# Patient Record
Sex: FEMALE | Race: WHITE | NOT HISPANIC OR LATINO | Employment: UNEMPLOYED | URBAN - METROPOLITAN AREA
[De-identification: names, ages, dates, MRNs, and addresses within clinical notes are randomized per-mention and may not be internally consistent; named-entity substitution may affect disease eponyms.]

---

## 2017-11-30 ENCOUNTER — HOSPITAL ENCOUNTER (EMERGENCY)
Facility: HOSPITAL | Age: 27
Discharge: HOME | End: 2017-11-30
Attending: EMERGENCY MEDICINE | Admitting: EMERGENCY MEDICINE
Payer: COMMERCIAL

## 2017-11-30 VITALS
RESPIRATION RATE: 16 BRPM | OXYGEN SATURATION: 97 % | DIASTOLIC BLOOD PRESSURE: 57 MMHG | SYSTOLIC BLOOD PRESSURE: 101 MMHG | HEART RATE: 100 BPM | TEMPERATURE: 98.3 F | BODY MASS INDEX: 28.12 KG/M2 | WEIGHT: 163.8 LBS

## 2017-11-30 DIAGNOSIS — T40.1X1A HEROIN OVERDOSE (HCC): Primary | ICD-10-CM

## 2017-11-30 LAB
ATRIAL RATE: 90 BPM
P AXIS: 59 DEGREES
PR INTERVAL: 170 MS
QRS AXIS: 65 DEGREES
QRSD INTERVAL: 86 MS
QT INTERVAL: 334 MS
QTC INTERVAL: 413 MS
T WAVE AXIS: 10 DEGREES
VENTRICULAR RATE: 92 BPM

## 2017-11-30 PROCEDURE — 93005 ELECTROCARDIOGRAM TRACING: CPT

## 2017-11-30 PROCEDURE — 99284 EMERGENCY DEPT VISIT MOD MDM: CPT

## 2017-11-30 NOTE — DISCHARGE INSTRUCTIONS
DIAGNOSIS: HEROIN OVERDOSE    - PLEASE CONSIDER CALLING THE [facility] TO GET HELP [phone]

## 2017-11-30 NOTE — ED NOTES
Attempted to ambulate pt  Pt refused   Pt states "I don't want to because I feel nauseas "      Karie Maurice RN  11/30/17 9077

## 2017-12-04 NOTE — ED PROVIDER NOTES
History  Chief Complaint   Patient presents with    Heroin Overdose - Accidental     Per EMS pt found unconscious unresponsive, blue  friends brought pt into bathtub to wake with cold water  Police on location gave 8mg Narcan nasal  pt vomited once prior to EMS arrival      28 yr female daily idu of hwroin -found down -- received intra-nasal anrcan x 2-- with reverdal -- pt now with no comps- does nto want any rehab help-- no si/plan/intnet -         History provided by:  Patient and EMS personnel   used: No        None       History reviewed  No pertinent past medical history  Past Surgical History:   Procedure Laterality Date     SECTION      x 3    TUBAL LIGATION         History reviewed  No pertinent family history  I have reviewed and agree with the history as documented  Social History   Substance Use Topics    Smoking status: Current Every Day Smoker     Packs/day: 0 50     Types: Cigarettes    Smokeless tobacco: Never Used    Alcohol use No        Review of Systems   Constitutional: Negative  HENT: Negative  Eyes: Negative  Respiratory: Negative  Cardiovascular: Negative  Gastrointestinal: Negative  Endocrine: Negative  Genitourinary: Negative  Musculoskeletal: Negative  Skin: Negative  Allergic/Immunologic: Negative  Neurological: Negative  Hematological: Negative  Psychiatric/Behavioral: Negative          Physical Exam  ED Triage Vitals   Temperature Pulse Respirations Blood Pressure SpO2   17 0043 17 0039 17 0039 17 0043 17 0043   98 3 °F (36 8 °C) 96 18 110/71 99 %      Temp Source Heart Rate Source Patient Position - Orthostatic VS BP Location FiO2 (%)   17 0043 17 0039 17 0043 17 0043 --   Oral Monitor Sitting Right arm       Pain Score       17 0039       No Pain           Orthostatic Vital Signs  Vitals:    17 0043 17 0331 17 0625 17 0630 BP: 110/71 110/76 107/53 101/57   Pulse:  (!) 115 93 100   Patient Position - Orthostatic VS: Sitting Sitting Lying Lying       Physical Exam   Constitutional: She is oriented to person, place, and time  She appears well-developed and well-nourished  No distress  avss-- pulse ox 97 % on ra- intepretation is normal- no intervention    HENT:   Head: Normocephalic and atraumatic  Eyes: Conjunctivae and EOM are normal  Pupils are equal, round, and reactive to light  Right eye exhibits no discharge  Left eye exhibits no discharge  No scleral icterus  Neck: Normal range of motion  Neck supple  No JVD present  No tracheal deviation present  No thyromegaly present  Cardiovascular: Normal rate, regular rhythm, normal heart sounds and intact distal pulses  Exam reveals no gallop and no friction rub  No murmur heard  Pulmonary/Chest: Effort normal and breath sounds normal  No stridor  No respiratory distress  She has no wheezes  She has no rales  She exhibits no tenderness  Abdominal: Soft  Bowel sounds are normal  She exhibits no distension and no mass  There is no tenderness  There is no rebound and no guarding  No hernia  Musculoskeletal: Normal range of motion  She exhibits no edema, tenderness or deformity  Lymphadenopathy:     She has no cervical adenopathy  Neurological: She is alert and oriented to person, place, and time  No cranial nerve deficit or sensory deficit  She exhibits normal muscle tone  Coordination normal    Skin: Skin is warm  Capillary refill takes less than 2 seconds  No rash noted  She is not diaphoretic  No erythema  No pallor  Bilateral ac track marks-- no signs of infection/abscess   Psychiatric: She has a normal mood and affect  Her behavior is normal  Judgment and thought content normal    Vitals reviewed        ED Medications  Medications - No data to display    Diagnostic Studies  Results Reviewed     None                 No orders to display Procedures  Procedures       Phone Contacts  ED Phone Contact    ED Course  ED Course as of Dec 04 0545   Thu Nov 30, 2017   2721 Er md note-  pt tolerated ambulation in er prior to er d/c- is not interested in rehab will d/c                                 MDM  CritCare Time    Disposition  Final diagnoses:   Heroin overdose     Time reflects when diagnosis was documented in both MDM as applicable and the Disposition within this note     Time User Action Codes Description Comment    11/30/2017  5:15 AM Realmarino Sean Add [T40 [id number]] Heroin overdose       ED Disposition     ED Disposition Condition Comment    Discharge  Werner Christopher discharge to home/self care  Condition at discharge: Good        Follow-up Information    None       There are no discharge medications for this patient  No discharge procedures on file      ED Provider  Electronically Signed by           Fabiano Jacob MD  12/04/17 6667

## 2021-06-08 ENCOUNTER — APPOINTMENT (EMERGENCY)
Dept: CT IMAGING | Facility: HOSPITAL | Age: 31
End: 2021-06-08
Payer: OTHER GOVERNMENT

## 2021-06-08 ENCOUNTER — HOSPITAL ENCOUNTER (OUTPATIENT)
Facility: HOSPITAL | Age: 31
Setting detail: OUTPATIENT SURGERY
Discharge: HOME | End: 2021-06-10
Attending: EMERGENCY MEDICINE | Admitting: SURGERY
Payer: OTHER GOVERNMENT

## 2021-06-08 ENCOUNTER — APPOINTMENT (EMERGENCY)
Dept: ULTRASOUND IMAGING | Facility: HOSPITAL | Age: 31
End: 2021-06-08
Payer: OTHER GOVERNMENT

## 2021-06-08 DIAGNOSIS — Z90.49 STATUS POST LAPAROSCOPIC CHOLECYSTECTOMY: ICD-10-CM

## 2021-06-08 DIAGNOSIS — K81.0 ACUTE CHOLECYSTITIS: Primary | ICD-10-CM

## 2021-06-08 DIAGNOSIS — R10.11 RIGHT UPPER QUADRANT PAIN: ICD-10-CM

## 2021-06-08 LAB
ALBUMIN SERPL BCP-MCNC: 3.8 G/DL (ref 3.5–5)
ALP SERPL-CCNC: 103 U/L (ref 46–116)
ALT SERPL W P-5'-P-CCNC: 47 U/L (ref 12–78)
ANION GAP SERPL CALCULATED.3IONS-SCNC: 10 MMOL/L (ref 4–13)
AST SERPL W P-5'-P-CCNC: 16 U/L (ref 5–45)
BACTERIA UR QL AUTO: ABNORMAL /HPF
BASOPHILS # BLD AUTO: 0.07 THOUSANDS/ΜL (ref 0–0.1)
BASOPHILS NFR BLD AUTO: 1 % (ref 0–1)
BILIRUB DIRECT SERPL-MCNC: 0.13 MG/DL (ref 0–0.2)
BILIRUB SERPL-MCNC: 0.47 MG/DL (ref 0.2–1)
BILIRUB UR QL STRIP: NEGATIVE
BUN SERPL-MCNC: 14 MG/DL (ref 5–25)
CALCIUM SERPL-MCNC: 8.9 MG/DL (ref 8.3–10.1)
CHLORIDE SERPL-SCNC: 101 MMOL/L (ref 100–108)
CLARITY UR: ABNORMAL
CO2 SERPL-SCNC: 29 MMOL/L (ref 21–32)
COLOR UR: YELLOW
CREAT SERPL-MCNC: 0.82 MG/DL (ref 0.6–1.3)
EOSINOPHIL # BLD AUTO: 0.07 THOUSAND/ΜL (ref 0–0.61)
EOSINOPHIL NFR BLD AUTO: 1 % (ref 0–6)
ERYTHROCYTE [DISTWIDTH] IN BLOOD BY AUTOMATED COUNT: 12.4 % (ref 11.6–15.1)
EXT PREG TEST URINE: NEGATIVE
EXT. CONTROL ED NAV: NORMAL
GFR SERPL CREATININE-BSD FRML MDRD: 96 ML/MIN/1.73SQ M
GLUCOSE SERPL-MCNC: 101 MG/DL (ref 65–140)
GLUCOSE UR STRIP-MCNC: NEGATIVE MG/DL
HCT VFR BLD AUTO: 43.6 % (ref 34.8–46.1)
HGB BLD-MCNC: 14 G/DL (ref 11.5–15.4)
HGB UR QL STRIP.AUTO: NEGATIVE
IMM GRANULOCYTES # BLD AUTO: 0.03 THOUSAND/UL (ref 0–0.2)
IMM GRANULOCYTES NFR BLD AUTO: 0 % (ref 0–2)
KETONES UR STRIP-MCNC: NEGATIVE MG/DL
LACTATE SERPL-SCNC: 1 MMOL/L (ref 0.5–2)
LEUKOCYTE ESTERASE UR QL STRIP: ABNORMAL
LYMPHOCYTES # BLD AUTO: 3.82 THOUSANDS/ΜL (ref 0.6–4.47)
LYMPHOCYTES NFR BLD AUTO: 30 % (ref 14–44)
MCH RBC QN AUTO: 28.5 PG (ref 26.8–34.3)
MCHC RBC AUTO-ENTMCNC: 32.1 G/DL (ref 31.4–37.4)
MCV RBC AUTO: 89 FL (ref 82–98)
MONOCYTES # BLD AUTO: 0.72 THOUSAND/ΜL (ref 0.17–1.22)
MONOCYTES NFR BLD AUTO: 6 % (ref 4–12)
NEUTROPHILS # BLD AUTO: 8.07 THOUSANDS/ΜL (ref 1.85–7.62)
NEUTS SEG NFR BLD AUTO: 62 % (ref 43–75)
NITRITE UR QL STRIP: NEGATIVE
NON-SQ EPI CELLS URNS QL MICRO: ABNORMAL /HPF
NRBC BLD AUTO-RTO: 0 /100 WBCS
PH UR STRIP.AUTO: 6 [PH]
PLATELET # BLD AUTO: 347 THOUSANDS/UL (ref 149–390)
PMV BLD AUTO: 10.8 FL (ref 8.9–12.7)
POTASSIUM SERPL-SCNC: 3.9 MMOL/L (ref 3.5–5.3)
PROT SERPL-MCNC: 8.2 G/DL (ref 6.4–8.2)
PROT UR STRIP-MCNC: ABNORMAL MG/DL
RBC # BLD AUTO: 4.91 MILLION/UL (ref 3.81–5.12)
RBC #/AREA URNS AUTO: ABNORMAL /HPF
SODIUM SERPL-SCNC: 140 MMOL/L (ref 136–145)
SP GR UR STRIP.AUTO: >=1.03 (ref 1–1.03)
UROBILINOGEN UR QL STRIP.AUTO: 1 E.U./DL
WBC # BLD AUTO: 12.78 THOUSAND/UL (ref 4.31–10.16)
WBC #/AREA URNS AUTO: ABNORMAL /HPF

## 2021-06-08 PROCEDURE — 36415 COLL VENOUS BLD VENIPUNCTURE: CPT | Performed by: EMERGENCY MEDICINE

## 2021-06-08 PROCEDURE — 96376 TX/PRO/DX INJ SAME DRUG ADON: CPT

## 2021-06-08 PROCEDURE — 96375 TX/PRO/DX INJ NEW DRUG ADDON: CPT

## 2021-06-08 PROCEDURE — 80076 HEPATIC FUNCTION PANEL: CPT | Performed by: EMERGENCY MEDICINE

## 2021-06-08 PROCEDURE — 76705 ECHO EXAM OF ABDOMEN: CPT

## 2021-06-08 PROCEDURE — 81001 URINALYSIS AUTO W/SCOPE: CPT | Performed by: EMERGENCY MEDICINE

## 2021-06-08 PROCEDURE — 85025 COMPLETE CBC W/AUTO DIFF WBC: CPT | Performed by: EMERGENCY MEDICINE

## 2021-06-08 PROCEDURE — 99220 PR INITIAL OBSERVATION CARE/DAY 70 MINUTES: CPT | Performed by: SURGERY

## 2021-06-08 PROCEDURE — 96365 THER/PROPH/DIAG IV INF INIT: CPT

## 2021-06-08 PROCEDURE — 80048 BASIC METABOLIC PNL TOTAL CA: CPT | Performed by: EMERGENCY MEDICINE

## 2021-06-08 PROCEDURE — 81025 URINE PREGNANCY TEST: CPT | Performed by: EMERGENCY MEDICINE

## 2021-06-08 PROCEDURE — 74177 CT ABD & PELVIS W/CONTRAST: CPT

## 2021-06-08 PROCEDURE — G1004 CDSM NDSC: HCPCS

## 2021-06-08 PROCEDURE — 83605 ASSAY OF LACTIC ACID: CPT | Performed by: EMERGENCY MEDICINE

## 2021-06-08 PROCEDURE — 99285 EMERGENCY DEPT VISIT HI MDM: CPT | Performed by: EMERGENCY MEDICINE

## 2021-06-08 PROCEDURE — 99285 EMERGENCY DEPT VISIT HI MDM: CPT

## 2021-06-08 RX ORDER — ACETAMINOPHEN 325 MG/1
650 TABLET ORAL EVERY 4 HOURS PRN
Status: DISCONTINUED | OUTPATIENT
Start: 2021-06-08 | End: 2021-06-09

## 2021-06-08 RX ORDER — CEFAZOLIN SODIUM 2 G/50ML
2000 SOLUTION INTRAVENOUS EVERY 8 HOURS
Status: DISCONTINUED | OUTPATIENT
Start: 2021-06-08 | End: 2021-06-09

## 2021-06-08 RX ORDER — KETOROLAC TROMETHAMINE 30 MG/ML
15 INJECTION, SOLUTION INTRAMUSCULAR; INTRAVENOUS ONCE
Status: COMPLETED | OUTPATIENT
Start: 2021-06-08 | End: 2021-06-08

## 2021-06-08 RX ORDER — HYDROMORPHONE HCL/PF 1 MG/ML
0.5 SYRINGE (ML) INJECTION
Status: DISCONTINUED | OUTPATIENT
Start: 2021-06-08 | End: 2021-06-09

## 2021-06-08 RX ORDER — OXYCODONE HYDROCHLORIDE 5 MG/1
5 TABLET ORAL EVERY 4 HOURS PRN
Status: DISCONTINUED | OUTPATIENT
Start: 2021-06-08 | End: 2021-06-08

## 2021-06-08 RX ORDER — ONDANSETRON 2 MG/ML
4 INJECTION INTRAMUSCULAR; INTRAVENOUS EVERY 6 HOURS PRN
Status: DISCONTINUED | OUTPATIENT
Start: 2021-06-08 | End: 2021-06-10 | Stop reason: HOSPADM

## 2021-06-08 RX ORDER — SODIUM CHLORIDE, SODIUM LACTATE, POTASSIUM CHLORIDE, CALCIUM CHLORIDE 600; 310; 30; 20 MG/100ML; MG/100ML; MG/100ML; MG/100ML
100 INJECTION, SOLUTION INTRAVENOUS CONTINUOUS
Status: DISCONTINUED | OUTPATIENT
Start: 2021-06-08 | End: 2021-06-09

## 2021-06-08 RX ORDER — ONDANSETRON 2 MG/ML
4 INJECTION INTRAMUSCULAR; INTRAVENOUS ONCE
Status: COMPLETED | OUTPATIENT
Start: 2021-06-08 | End: 2021-06-08

## 2021-06-08 RX ORDER — NICOTINE 21 MG/24HR
1 PATCH, TRANSDERMAL 24 HOURS TRANSDERMAL DAILY
Status: DISCONTINUED | OUTPATIENT
Start: 2021-06-08 | End: 2021-06-10 | Stop reason: HOSPADM

## 2021-06-08 RX ORDER — OXYCODONE HYDROCHLORIDE 10 MG/1
10 TABLET ORAL EVERY 4 HOURS PRN
Status: DISCONTINUED | OUTPATIENT
Start: 2021-06-08 | End: 2021-06-08

## 2021-06-08 RX ORDER — MORPHINE SULFATE 4 MG/ML
4 INJECTION, SOLUTION INTRAMUSCULAR; INTRAVENOUS ONCE
Status: COMPLETED | OUTPATIENT
Start: 2021-06-08 | End: 2021-06-08

## 2021-06-08 RX ORDER — SODIUM CHLORIDE 9 MG/ML
50 INJECTION, SOLUTION INTRAVENOUS CONTINUOUS
Status: DISCONTINUED | OUTPATIENT
Start: 2021-06-08 | End: 2021-06-08

## 2021-06-08 RX ADMIN — ONDANSETRON 4 MG: 2 INJECTION INTRAMUSCULAR; INTRAVENOUS at 13:23

## 2021-06-08 RX ADMIN — SODIUM CHLORIDE 50 ML/HR: 0.9 INJECTION, SOLUTION INTRAVENOUS at 15:04

## 2021-06-08 RX ADMIN — HYDROMORPHONE HYDROCHLORIDE 0.5 MG: 1 INJECTION, SOLUTION INTRAMUSCULAR; INTRAVENOUS; SUBCUTANEOUS at 22:03

## 2021-06-08 RX ADMIN — ONDANSETRON 4 MG: 2 INJECTION INTRAMUSCULAR; INTRAVENOUS at 11:22

## 2021-06-08 RX ADMIN — SODIUM CHLORIDE, SODIUM LACTATE, POTASSIUM CHLORIDE, AND CALCIUM CHLORIDE 1000 ML: .6; .31; .03; .02 INJECTION, SOLUTION INTRAVENOUS at 11:22

## 2021-06-08 RX ADMIN — ONDANSETRON 4 MG: 2 INJECTION INTRAMUSCULAR; INTRAVENOUS at 18:42

## 2021-06-08 RX ADMIN — CEFAZOLIN SODIUM 2000 MG: 2 SOLUTION INTRAVENOUS at 15:05

## 2021-06-08 RX ADMIN — SODIUM CHLORIDE, SODIUM LACTATE, POTASSIUM CHLORIDE, AND CALCIUM CHLORIDE 100 ML/HR: .6; .31; .03; .02 INJECTION, SOLUTION INTRAVENOUS at 21:18

## 2021-06-08 RX ADMIN — KETOROLAC TROMETHAMINE 15 MG: 30 INJECTION, SOLUTION INTRAMUSCULAR; INTRAVENOUS at 11:22

## 2021-06-08 RX ADMIN — IOHEXOL 100 ML: 350 INJECTION, SOLUTION INTRAVENOUS at 12:17

## 2021-06-08 RX ADMIN — SODIUM CHLORIDE, SODIUM LACTATE, POTASSIUM CHLORIDE, AND CALCIUM CHLORIDE 100 ML/HR: .6; .31; .03; .02 INJECTION, SOLUTION INTRAVENOUS at 16:01

## 2021-06-08 RX ADMIN — HYDROMORPHONE HYDROCHLORIDE 0.5 MG: 1 INJECTION, SOLUTION INTRAMUSCULAR; INTRAVENOUS; SUBCUTANEOUS at 18:42

## 2021-06-08 RX ADMIN — CEFAZOLIN SODIUM 2000 MG: 2 SOLUTION INTRAVENOUS at 21:18

## 2021-06-08 RX ADMIN — MORPHINE SULFATE 4 MG: 4 INJECTION INTRAVENOUS at 13:23

## 2021-06-08 RX ADMIN — HYDROMORPHONE HYDROCHLORIDE 0.5 MG: 1 INJECTION, SOLUTION INTRAMUSCULAR; INTRAVENOUS; SUBCUTANEOUS at 15:11

## 2021-06-08 RX ADMIN — NICOTINE 1 PATCH: 14 PATCH, EXTENDED RELEASE TRANSDERMAL at 15:06

## 2021-06-08 NOTE — H&P
H&P Exam - General Surgery   Kb Dumont 31 y o  female MRN: [id number]  Unit/Bed#: ED 15 Encounter: [id number]    Assessment/Plan     Kb Dumont is a 31 y o  female    Acute cholecystitis  CT of abdomen and pelvis showed abnormal appearance of the gallbladder with distention and pericholecystic edema  The appearance may suggest cholecystitis     Admit to hospital under surgical services given findings of acute cholecystitis  Plan for OR tomorrow for laparoscopic cholecystectomy, possible open  Patient is agreeable to plan, informed consent will be obtained by the surgeon   Follow up on gallbladder US results   Arleene Nicki for clear liquids as tolerated at this time, NPO at midnight for OR  IV ancef/flagyl for coverage for acute cholecystitis   Serial abdominal exams  Trend labs, mild leukocytosis on presentation  Pain control PRN  DVT prophylaxis   IS    History of Present Illness     HPI:  Kb Dumont is a 31 y o  female who presents with acute onset abdominal pain  The patient states the pain started suddenly this AM  She states the pain is primarily in RUQ and that it has worsened since onset which prompted her to go to the ED  CT of the abdomen and pelvis showed an abnormal appearance of the gallbladder with distention and pericholecystic edema suggesting acute cholecystitis At this time, the patient still notes significant abdominal pain  She also notes some nausea and emesis  Patient denies any prior history of similar pain, but does note that she has had intermittent mild post-prandial pain associated with large meals in the past that she never sought medical attention for  Prior medical history includes active IVDU  Patient reports usage of heroin and methamphetamines  She states she last used approximately 5 days ago  She denies any alcohol usage  She is a current 0 5 pack/day smoker  She denies any other medical history  Prior surgical history includes C-sections x3 and tubal ligation      Review of Systems   Constitutional: Negative for activity change, appetite change, fatigue, fever and unexpected weight change  HENT: Negative for congestion, tinnitus and voice change  Eyes: Negative for photophobia, discharge and visual disturbance  Respiratory: Negative for apnea, chest tightness, shortness of breath, wheezing and stridor  Cardiovascular: Negative for chest pain and leg swelling  Gastrointestinal: Positive for abdominal pain, nausea and vomiting  Negative for abdominal distention, constipation and rectal pain  Endocrine: Negative for cold intolerance, polydipsia, polyphagia and polyuria  Genitourinary: Negative for decreased urine volume, difficulty urinating, dysuria, flank pain, hematuria, pelvic pain and urgency  Musculoskeletal: Negative for back pain, neck pain and neck stiffness  Skin: Negative for color change, pallor, rash and wound  Neurological: Negative for dizziness, tremors, syncope and weakness  Historical Information   History reviewed  No pertinent past medical history  Past Surgical History:   Procedure Laterality Date     SECTION      x 3    TUBAL LIGATION       Social History   Social History     Substance and Sexual Activity   Alcohol Use No     Social History     Substance and Sexual Activity   Drug Use Yes    Types: Heroin, Marijuana, Methamphetamines    Comment: heroin - around 3-5 bags a day for 1 year Marijuana - "not often, just sometimes"     Social History     Tobacco Use   Smoking Status Current Every Day Smoker    Packs/day: 0 50    Types: Cigarettes   Smokeless Tobacco Never Used     Family History: History reviewed  No pertinent family history      Meds/Allergies   PTA meds:   None     No Known Allergies    Objective   First Vitals:   Blood Pressure: 115/79 (21 1036)  Pulse: 55 (21 1036)  Temperature: 98 3 °F (36 8 °C) (21 1036)  Temp Source: Oral (21 1036)  Respirations: 17 (21 1036)  Height: 5' 4" (162 6 cm) (06/08/21 1036)  Weight - Scale: 73 2 kg (161 lb 6 oz) (06/08/21 1036)  SpO2: 100 % (06/08/21 1036)    Current Vitals:   Blood Pressure: 122/79 (06/08/21 1323)  Pulse: 62 (06/08/21 1323)  Temperature: 98 3 °F (36 8 °C) (06/08/21 1036)  Temp Source: Oral (06/08/21 1036)  Respirations: 17 (06/08/21 1323)  Height: 5' 4" (162 6 cm) (06/08/21 1036)  Weight - Scale: 73 2 kg (161 lb 6 oz) (06/08/21 1036)  SpO2: 98 % (06/08/21 1323)      Intake/Output Summary (Last 24 hours) at 6/8/2021 1328  Last data filed at 6/8/2021 1219  Gross per 24 hour   Intake 1000 ml   Output --   Net 1000 ml       Invasive Devices     Peripheral Intravenous Line            Peripheral IV 06/08/21 Right Antecubital less than 1 day                Physical Exam  Constitutional:       General: She is not in acute distress  Appearance: She is well-developed  She is not diaphoretic  HENT:      Head: Normocephalic and atraumatic  Right Ear: External ear normal       Left Ear: External ear normal       Mouth/Throat:      Pharynx: No oropharyngeal exudate  Eyes:      Conjunctiva/sclera: Conjunctivae normal       Pupils: Pupils are equal, round, and reactive to light  Neck:      Musculoskeletal: Normal range of motion and neck supple  Thyroid: No thyromegaly  Trachea: No tracheal deviation  Cardiovascular:      Rate and Rhythm: Normal rate and regular rhythm  Heart sounds: Normal heart sounds  No murmur  No friction rub  No gallop  Pulmonary:      Effort: Pulmonary effort is normal  No respiratory distress  Breath sounds: Normal breath sounds  No wheezing or rales  Chest:      Chest wall: No tenderness  Abdominal:      General: Bowel sounds are normal  There is no distension  Palpations: Abdomen is soft  Tenderness: There is abdominal tenderness  There is no guarding or rebound        Comments: Soft, non-distended, active bowel sounds, focally tender to palpation in RUQ, +miranda's sign Musculoskeletal: Normal range of motion  General: No tenderness or deformity  Skin:     General: Skin is warm and dry  Coloration: Skin is not pale  Findings: No erythema or rash  Neurological:      Mental Status: She is oriented to person, place, and time  Cranial Nerves: No cranial nerve deficit  Coordination: Coordination normal       Deep Tendon Reflexes: Reflexes are normal and symmetric  I have personally reviewed pertinent lab results        Lab Results:   Recent Results (from the past 36 hour(s))   UA w Reflex to Microscopic w Reflex to Culture    Collection Time: 06/08/21 11:15 AM    Specimen: Urine, Clean Catch   Result Value Ref Range    Color, UA Yellow     Clarity, UA Turbid     Specific Gravity, UA >=1 030 1 003 - 1 030    pH, UA 6 0 4 5, 5 0, 5 5, 6 0, 6 5, 7 0, 7 5, 8 0    Leukocytes, UA Trace (A) Negative    Nitrite, UA Negative Negative    Protein, UA Trace (A) Negative mg/dl    Glucose, UA Negative Negative mg/dl    Ketones, UA Negative Negative mg/dl    Urobilinogen, UA 1 0 0 2, 1 0 E U /dl E U /dl    Bilirubin, UA Negative Negative    Blood, UA Negative Negative   Urine Microscopic    Collection Time: 06/08/21 11:15 AM   Result Value Ref Range    RBC, UA None Seen None Seen, 0-1, 1-2, 2-4, 0-5 /hpf    WBC, UA 1-2 None Seen, 0-1, 1-2, 0-5, 2-4 /hpf    Epithelial Cells Moderate (A) None Seen, Occasional /hpf    Bacteria, UA Occasional None Seen, Occasional /hpf   POCT pregnancy, urine    Collection Time: 06/08/21 11:18 AM   Result Value Ref Range    EXT PREG TEST UR (Ref: Negative) negative     Control valid    Lactic acid    Collection Time: 06/08/21 11:22 AM   Result Value Ref Range    LACTIC ACID 1 0 0 5 - 2 0 mmol/L   Basic metabolic panel    Collection Time: 06/08/21 11:22 AM   Result Value Ref Range    Sodium 140 136 - 145 mmol/L    Potassium 3 9 3 5 - 5 3 mmol/L    Chloride 101 100 - 108 mmol/L    CO2 29 21 - 32 mmol/L    ANION GAP 10 4 - 13 mmol/L BUN 14 5 - 25 mg/dL    Creatinine 0 82 0 60 - 1 30 mg/dL    Glucose 101 65 - 140 mg/dL    Calcium 8 9 8 3 - 10 1 mg/dL    eGFR 96 ml/min/1 73sq m   Hepatic function panel    Collection Time: 06/08/21 11:22 AM   Result Value Ref Range    Total Bilirubin 0 47 0 20 - 1 00 mg/dL    Bilirubin, Direct 0 13 0 00 - 0 20 mg/dL    Alkaline Phosphatase 103 46 - 116 U/L    AST 16 5 - 45 U/L    ALT 47 12 - 78 U/L    Total Protein 8 2 6 4 - 8 2 g/dL    Albumin 3 8 3 5 - 5 0 g/dL   CBC and differential    Collection Time: 06/08/21 11:22 AM   Result Value Ref Range    WBC 12 78 (H) 4 31 - 10 16 Thousand/uL    RBC 4 91 3 81 - 5 12 Million/uL    Hemoglobin 14 0 11 5 - 15 4 g/dL    Hematocrit 43 6 34 8 - 46 1 %    MCV 89 82 - 98 fL    MCH 28 5 26 8 - 34 3 pg    MCHC 32 1 31 4 - 37 4 g/dL    RDW 12 4 11 6 - 15 1 %    MPV 10 8 8 9 - 12 7 fL    Platelets 300 067 - 378 Thousands/uL    nRBC 0 /100 WBCs    Neutrophils Relative 62 43 - 75 %    Immat GRANS % 0 0 - 2 %    Lymphocytes Relative 30 14 - 44 %    Monocytes Relative 6 4 - 12 %    Eosinophils Relative 1 0 - 6 %    Basophils Relative 1 0 - 1 %    Neutrophils Absolute 8 07 (H) 1 85 - 7 62 Thousands/µL    Immature Grans Absolute 0 03 0 00 - 0 20 Thousand/uL    Lymphocytes Absolute 3 82 0 60 - 4 47 Thousands/µL    Monocytes Absolute 0 72 0 17 - 1 22 Thousand/µL    Eosinophils Absolute 0 07 0 00 - 0 61 Thousand/µL    Basophils Absolute 0 07 0 00 - 0 10 Thousands/µL     Imaging: I have personally reviewed pertinent reports  Ct Abdomen Pelvis With Contrast    Result Date: 6/8/2021  Impression: Abnormal appearance of the gallbladder with distention and pericholecystic edema  The appearance may suggest cholecystitis  Consider follow-up ultrasound  The study was marked in [facility] for immediate notification  Workstation performed: [id number]        EKG, Pathology, and Other Studies: I have personally reviewed pertinent reports

## 2021-06-08 NOTE — ED PROVIDER NOTES
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  History provided by:  Patient   used: No    Abdominal Pain  Pain location:  RUQ and epigastric  Pain quality: sharp and stabbing    Pain radiates to:  Back  Pain severity:  Moderate  Onset quality:  Gradual  Timing:  Constant  Progression:  Worsening  Chronicity:  New  Context comment:  Drug use/withdrawal  Relieved by:  Nothing  Worsened by:  Nothing  Ineffective treatments:  None tried  Associated symptoms: no chest pain, no chills, no cough, no dysuria, no fever, no hematuria, no shortness of breath, no sore throat and no vomiting        None       History reviewed  No pertinent past medical history  Past Surgical History:   Procedure Laterality Date     SECTION      x 3    CHOLECYSTECTOMY LAPAROSCOPIC N/A 2021    Procedure: CHOLECYSTECTOMY LAPAROSCOPIC;  Surgeon: Kenton Chaudhary MD;  Location: [facility] OR;  Service: General    TUBAL LIGATION         History reviewed  No pertinent family history  I have reviewed and agree with the history as documented  E-Cigarette/Vaping     E-Cigarette/Vaping Substances     Social History     Tobacco Use    Smoking status: Current Every Day Smoker     Packs/day: 0 50     Types: Cigarettes    Smokeless tobacco: Never Used   Substance Use Topics    Alcohol use: Never    Drug use: Yes     Types: Heroin, Marijuana, Methamphetamines     Comment: heroin - around 3-5 bags a day for 1 year Marijuana - "not often, just sometimes"       Review of Systems   Constitutional: Negative for chills and fever  HENT: Negative for ear pain and sore throat  Eyes: Negative for pain and visual disturbance  Respiratory: Negative for cough and shortness of breath  Cardiovascular: Negative for chest pain and palpitations  Gastrointestinal: Positive for abdominal pain  Negative for vomiting  Genitourinary: Negative for dysuria and hematuria  Musculoskeletal: Negative for arthralgias and back pain  Skin: Negative for color change and rash  Neurological: Negative for seizures and syncope  All other systems reviewed and are negative  Physical Exam  Physical Exam  Vitals reviewed  Constitutional:       Appearance: She is well-developed  She is not diaphoretic  HENT:      Head: Normocephalic and atraumatic  Eyes:      General: No scleral icterus  Conjunctiva/sclera: Conjunctivae normal       Pupils: Pupils are equal, round, and reactive to light  Neck:      Vascular: No JVD  Trachea: No tracheal deviation  Cardiovascular:      Rate and Rhythm: Normal rate and regular rhythm  Pulmonary:      Effort: Pulmonary effort is normal  No respiratory distress  Breath sounds: Normal breath sounds  Abdominal:      General: There is no distension  Palpations: Abdomen is soft  Tenderness: There is no abdominal tenderness  Musculoskeletal:         General: No tenderness or deformity  Normal range of motion  Cervical back: Normal range of motion and neck supple  Lymphadenopathy:      Cervical: No cervical adenopathy  Skin:     General: Skin is warm and dry  Capillary Refill: Capillary refill takes less than 2 seconds  Findings: No rash  Neurological:      General: No focal deficit present  Mental Status: She is alert and oriented to person, place, and time        Comments: No gross focal sensory or motor deficits         Vital Signs  ED Triage Vitals [06/08/21 1036]   Temperature Pulse Respirations Blood Pressure SpO2   98 3 °F (36 8 °C) 55 17 115/79 100 %      Temp Source Heart Rate Source Patient Position - Orthostatic VS BP Location FiO2 (%)   Oral Monitor Lying Right arm --      Pain Score       8           Vitals:    06/09/21 1513 06/09/21 1613 06/09/21 2320 06/10/21 0732   BP: 113/62 111/62 110/61 111/61   Pulse: (!) 49 (!) 50     Patient Position - Orthostatic VS: Lying Lying           Visual Acuity      ED Medications  Medications   lactated ringers bolus 1,000 mL (0 mL Intravenous Stopped 6/8/21 1219)   ondansetron (ZOFRAN) injection 4 mg (4 mg Intravenous Given 6/8/21 1122)   ketorolac (TORADOL) injection 15 mg (15 mg Intravenous Given 6/8/21 1122)   iohexol (OMNIPAQUE) 350 MG/ML injection (SINGLE-DOSE) 100 mL (100 mL Intravenous Given 6/8/21 1217)   morphine (PF) 4 mg/mL injection 4 mg (4 mg Intravenous Given 6/8/21 1323)   ondansetron (ZOFRAN) injection 4 mg (4 mg Intravenous Given 6/8/21 1323)   acetaminophen (TYLENOL) tablet 975 mg ( Oral MAR Unhold 6/9/21 0923)   ceFAZolin (ANCEF) IVPB (premix in dextrose) 2,000 mg 50 mL (2,000 mg Intravenous New Bag 6/9/21 0959)       Diagnostic Studies  Results Reviewed     Procedure Component Value Units Date/Time    Basic metabolic panel [[id number]] Collected: 06/09/21 0530    Lab Status: Final result Specimen: Blood from Arm, Left Updated: 06/09/21 1957     Sodium 138 mmol/L      Potassium 3 9 mmol/L      Chloride 103 mmol/L      CO2 28 mmol/L      ANION GAP 7 mmol/L      BUN 10 mg/dL      Creatinine 0 74 mg/dL      Glucose 86 mg/dL      Glucose, Fasting 86 mg/dL      Calcium 8 4 mg/dL      eGFR 108 ml/min/1 73sq m     Narrative:      Pk guidelines for Chronic Kidney Disease (CKD):     Stage 1 with normal or high GFR (GFR > 90 mL/min/1 73 square meters)    Stage 2 Mild CKD (GFR = 60-89 mL/min/1 73 square meters)    Stage 3A Moderate CKD (GFR = 45-59 mL/min/1 73 square meters)    Stage 3B Moderate CKD (GFR = 30-44 mL/min/1 73 square meters)    Stage 4 Severe CKD (GFR = 15-29 mL/min/1 73 square meters)    Stage 5 End Stage CKD (GFR <15 mL/min/1 73 square meters)  Note: GFR calculation is accurate only with a steady state creatinine    CBC and differential [[id number]] Collected: 06/09/21 0518 Lab Status: Final result Specimen: Blood from Arm, Left Updated: 06/09/21 0551     WBC 9 95 Thousand/uL      RBC 4 38 Million/uL      Hemoglobin 12 8 g/dL      Hematocrit 39 0 %      MCV 89 fL      MCH 29 2 pg      MCHC 32 8 g/dL      RDW 12 4 %      MPV 10 8 fL      Platelets 972 Thousands/uL      nRBC 0 /100 WBCs      Neutrophils Relative 49 %      Immat GRANS % 0 %      Lymphocytes Relative 43 %      Monocytes Relative 6 %      Eosinophils Relative 1 %      Basophils Relative 1 %      Neutrophils Absolute 4 89 Thousands/µL      Immature Grans Absolute 0 01 Thousand/uL      Lymphocytes Absolute 4 24 Thousands/µL      Monocytes Absolute 0 64 Thousand/µL      Eosinophils Absolute 0 12 Thousand/µL      Basophils Absolute 0 05 Thousands/µL     Urine Microscopic [[id number]]  (Abnormal) Collected: 06/08/21 1115    Lab Status: Final result Specimen: Urine, Clean Catch Updated: 06/08/21 1230     RBC, UA None Seen /hpf      WBC, UA 1-2 /hpf      Epithelial Cells Moderate /hpf      Bacteria, UA Occasional /hpf     Lactic acid [[id number]]  (Normal) Collected: 06/08/21 1122    Lab Status: Final result Specimen: Blood from Arm, Right Updated: 06/08/21 1151     LACTIC ACID 1 0 mmol/L     Narrative:      Result may be elevated if tourniquet was used during collection      Basic metabolic panel [[id number]] Collected: 06/08/21 1122    Lab Status: Final result Specimen: Blood from Arm, Right Updated: 06/08/21 1149     Sodium 140 mmol/L      Potassium 3 9 mmol/L      Chloride 101 mmol/L      CO2 29 mmol/L      ANION GAP 10 mmol/L      BUN 14 mg/dL      Creatinine 0 82 mg/dL      Glucose 101 mg/dL      Calcium 8 9 mg/dL      eGFR 96 ml/min/1 73sq m     Narrative:      [facility] guidelines for Chronic Kidney Disease (CKD):     Stage 1 with normal or high GFR (GFR > 90 mL/min/1 73 square meters)    Stage 2 Mild CKD (GFR = 60-89 mL/min/1 73 square meters)    Stage 3A Moderate CKD (GFR = 45-59 mL/min/1 73 square meters)    Stage 3B Moderate CKD (GFR = 30-44 mL/min/1 73 square meters)    Stage 4 Severe CKD (GFR = 15-29 mL/min/1 73 square meters)    Stage 5 End Stage CKD (GFR <15 mL/min/1 73 square meters)  Note: GFR calculation is accurate only with a steady state creatinine    Hepatic function panel [[id number]]  (Normal) Collected: 06/08/21 1122    Lab Status: Final result Specimen: Blood from Arm, Right Updated: 06/08/21 1149     Total Bilirubin 0 47 mg/dL      Bilirubin, Direct 0 13 mg/dL      Alkaline Phosphatase 103 U/L      AST 16 U/L      ALT 47 U/L      Total Protein 8 2 g/dL      Albumin 3 8 g/dL     CBC and differential [[id number]]  (Abnormal) Collected: 06/08/21 1122    Lab Status: Final result Specimen: Blood from Arm, Right Updated: 06/08/21 1135     WBC 12 78 Thousand/uL      RBC 4 91 Million/uL      Hemoglobin 14 0 g/dL      Hematocrit 43 6 %      MCV 89 fL      MCH 28 5 pg      MCHC 32 1 g/dL      RDW 12 4 %      MPV 10 8 fL      Platelets 505 Thousands/uL      nRBC 0 /100 WBCs      Neutrophils Relative 62 %      Immat GRANS % 0 %      Lymphocytes Relative 30 %      Monocytes Relative 6 %      Eosinophils Relative 1 %      Basophils Relative 1 %      Neutrophils Absolute 8 07 Thousands/µL      Immature Grans Absolute 0 03 Thousand/uL      Lymphocytes Absolute 3 82 Thousands/µL      Monocytes Absolute 0 72 Thousand/µL      Eosinophils Absolute 0 07 Thousand/µL      Basophils Absolute 0 07 Thousands/µL     POCT pregnancy, urine [[id number]]  (Normal) Resulted: 06/08/21 1118    Lab Status: Final result Updated: 06/08/21 1127     EXT PREG TEST UR (Ref: Negative) negative     Control valid    UA w Reflex to Microscopic w Reflex to Culture [[id number]]  (Abnormal) Collected: 06/08/21 1115    Lab Status: Final result Specimen: Urine, Clean Catch Updated: 06/08/21 1125     Color, UA Yellow     Clarity, UA Turbid     Specific Gravity, UA >=1 030     pH, UA 6 0     Leukocytes, UA Trace     Nitrite, UA Negative Protein, UA Trace mg/dl      Glucose, UA Negative mg/dl      Ketones, UA Negative mg/dl      Urobilinogen, UA 1 0 E U /dl      Bilirubin, UA Negative     Blood, UA Negative                 US gallbladder   Final Result by Mansi Armando MD (06/08 1430)      Abnormal study  Cholelithiasis with thickening of the gallbladder wall, pericholecystic edema suggestive of acute cholecystitis  There is a positive sonographic Robertson's sign  Common bile duct mildly distended at 7 mm  The study was marked in [facility] for immediate notification  Workstation performed: [id number]         CT abdomen pelvis with contrast   Final Result by Mansi Armando MD (06/08 1259)      Abnormal appearance of the gallbladder with distention and pericholecystic edema  The appearance may suggest cholecystitis  Consider follow-up ultrasound  The study was marked in [facility] for immediate notification  Workstation performed: [id number]                    Procedures  Procedures         ED Course                             SBIRT 20yo+      Most Recent Value   SBIRT (25 yo +)   In order to provide better care to our patients, we are screening all of our patients for alcohol and drug use  Would it be okay to ask you these screening questions? No Filed at: 06/08/2021 1143                    MDM  Number of Diagnoses or Management Options  Right upper quadrant pain: new and requires workup  Diagnosis management comments: Generalized abd pain worsening over hours and now localizing more to RUQ  Imaging suggestive of cholecystitis, treated sx in ed, pain and nausea improved, started abx, discussed with surgery for admission          Amount and/or Complexity of Data Reviewed  Clinical lab tests: reviewed and ordered  Tests in the radiology section of CPT®: reviewed and ordered  Review and summarize past medical records: yes  Discuss the patient with other providers: yes  Independent visualization of images, tracings, or specimens: yes        Disposition  Final diagnoses:   Right upper quadrant pain     Time reflects when diagnosis was documented in both MDM as applicable and the Disposition within this note     Time User Action Codes Description Comment    6/8/2021  2:01 PM Gee Bonilla Add [K81 0] Acute cholecystitis     6/8/2021  2:01 PM Meño ANDERSEN Add [R10 11] Right upper quadrant pain     6/9/2021 10:53 AM Tiffany Griffin [K81 0] Acute cholecystitis     6/10/2021 11:40 AM Miryam Malloy Modify [K81 0] Acute cholecystitis     6/10/2021 11:40 AM Anthony Ambrose Add [Z90 49] Status post laparoscopic cholecystectomy       ED Disposition     ED Disposition Condition Date/Time Comment    Admit Stable Tue Jun 8, 2021  2:01 PM Case was discussed with Gen Surgery and the patient's admission status was agreed to be Admission Status: observation status to the service of Dr Nereida Gunderson          Follow-up Information     Follow up With Specialties Details Why Contact Info    Coretta William MD General Surgery Schedule an appointment as soon as possible for a visit in 2 week(s)  [address]  [address]  [place] [phone]            Discharge Medication List as of 6/10/2021 12:24 PM      START taking these medications    Details   acetaminophen-codeine (TYLENOL #3) 300-30 mg per tablet Take 1 tablet by mouth every 4 (four) hours as needed for moderate pain for up to 10 doses, Starting Thu 6/10/2021, Print      ibuprofen (MOTRIN) 600 mg tablet Take 1 tablet (600 mg total) by mouth every 6 (six) hours as needed for mild pain, Starting Thu 6/10/2021, OTC           Outpatient Discharge Orders   Discharge Diet     Lifting restrictions     No strenuous exercise     Call provider for:  persistent nausea or vomiting     Call provider for:  severe uncontrolled pain     Call provider for:  redness, tenderness, or signs of infection (pain, swelling, redness, odor or green/yellow discharge around incision site)     Call provider for: active or persistent bleeding     Call provider for:  difficulty breathing, headache or visual disturbances     Call provider for:  persistent dizziness or light-headedness     Remove dressing in 24 hours       PDMP Review       Value Time User    PDMP Reviewed  Yes 6/10/2021 11:35 AM Jaynee Crigler, PA-C          ED Provider  Electronically Signed by           Babs Pollack MD  06/30/21 6064

## 2021-06-09 ENCOUNTER — ANESTHESIA (OUTPATIENT)
Dept: PERIOP | Facility: HOSPITAL | Age: 31
End: 2021-06-09
Payer: OTHER GOVERNMENT

## 2021-06-09 ENCOUNTER — ANESTHESIA EVENT (OUTPATIENT)
Dept: PERIOP | Facility: HOSPITAL | Age: 31
End: 2021-06-09
Payer: OTHER GOVERNMENT

## 2021-06-09 PROBLEM — K80.00 ACUTE CALCULOUS CHOLECYSTITIS: Status: ACTIVE | Noted: 2021-06-09

## 2021-06-09 LAB
ANION GAP SERPL CALCULATED.3IONS-SCNC: 7 MMOL/L (ref 4–13)
BASOPHILS # BLD AUTO: 0.05 THOUSANDS/ΜL (ref 0–0.1)
BASOPHILS NFR BLD AUTO: 1 % (ref 0–1)
BUN SERPL-MCNC: 10 MG/DL (ref 5–25)
CALCIUM SERPL-MCNC: 8.4 MG/DL (ref 8.3–10.1)
CHLORIDE SERPL-SCNC: 103 MMOL/L (ref 100–108)
CO2 SERPL-SCNC: 28 MMOL/L (ref 21–32)
CREAT SERPL-MCNC: 0.74 MG/DL (ref 0.6–1.3)
EOSINOPHIL # BLD AUTO: 0.12 THOUSAND/ΜL (ref 0–0.61)
EOSINOPHIL NFR BLD AUTO: 1 % (ref 0–6)
ERYTHROCYTE [DISTWIDTH] IN BLOOD BY AUTOMATED COUNT: 12.4 % (ref 11.6–15.1)
GFR SERPL CREATININE-BSD FRML MDRD: 108 ML/MIN/1.73SQ M
GLUCOSE P FAST SERPL-MCNC: 86 MG/DL (ref 65–99)
GLUCOSE SERPL-MCNC: 86 MG/DL (ref 65–140)
HCT VFR BLD AUTO: 39 % (ref 34.8–46.1)
HGB BLD-MCNC: 12.8 G/DL (ref 11.5–15.4)
IMM GRANULOCYTES # BLD AUTO: 0.01 THOUSAND/UL (ref 0–0.2)
IMM GRANULOCYTES NFR BLD AUTO: 0 % (ref 0–2)
LYMPHOCYTES # BLD AUTO: 4.24 THOUSANDS/ΜL (ref 0.6–4.47)
LYMPHOCYTES NFR BLD AUTO: 43 % (ref 14–44)
MCH RBC QN AUTO: 29.2 PG (ref 26.8–34.3)
MCHC RBC AUTO-ENTMCNC: 32.8 G/DL (ref 31.4–37.4)
MCV RBC AUTO: 89 FL (ref 82–98)
MONOCYTES # BLD AUTO: 0.64 THOUSAND/ΜL (ref 0.17–1.22)
MONOCYTES NFR BLD AUTO: 6 % (ref 4–12)
NEUTROPHILS # BLD AUTO: 4.89 THOUSANDS/ΜL (ref 1.85–7.62)
NEUTS SEG NFR BLD AUTO: 49 % (ref 43–75)
NRBC BLD AUTO-RTO: 0 /100 WBCS
PLATELET # BLD AUTO: 246 THOUSANDS/UL (ref 149–390)
PMV BLD AUTO: 10.8 FL (ref 8.9–12.7)
POTASSIUM SERPL-SCNC: 3.9 MMOL/L (ref 3.5–5.3)
RBC # BLD AUTO: 4.38 MILLION/UL (ref 3.81–5.12)
SODIUM SERPL-SCNC: 138 MMOL/L (ref 136–145)
WBC # BLD AUTO: 9.95 THOUSAND/UL (ref 4.31–10.16)

## 2021-06-09 PROCEDURE — 85025 COMPLETE CBC W/AUTO DIFF WBC: CPT | Performed by: PHYSICIAN ASSISTANT

## 2021-06-09 PROCEDURE — 88304 TISSUE EXAM BY PATHOLOGIST: CPT | Performed by: PATHOLOGY

## 2021-06-09 PROCEDURE — 47562 LAPAROSCOPIC CHOLECYSTECTOMY: CPT | Performed by: SURGERY

## 2021-06-09 PROCEDURE — 80048 BASIC METABOLIC PNL TOTAL CA: CPT | Performed by: PHYSICIAN ASSISTANT

## 2021-06-09 PROCEDURE — C1729 CATH, DRAINAGE: HCPCS | Performed by: SURGERY

## 2021-06-09 RX ORDER — HYDROMORPHONE HCL/PF 1 MG/ML
0.5 SYRINGE (ML) INJECTION EVERY 4 HOURS PRN
Status: DISCONTINUED | OUTPATIENT
Start: 2021-06-09 | End: 2021-06-10 | Stop reason: HOSPADM

## 2021-06-09 RX ORDER — LIDOCAINE HYDROCHLORIDE 10 MG/ML
INJECTION, SOLUTION EPIDURAL; INFILTRATION; INTRACAUDAL; PERINEURAL AS NEEDED
Status: DISCONTINUED | OUTPATIENT
Start: 2021-06-09 | End: 2021-06-09

## 2021-06-09 RX ORDER — HYDROMORPHONE HCL/PF 1 MG/ML
0.2 SYRINGE (ML) INJECTION
Status: DISCONTINUED | OUTPATIENT
Start: 2021-06-09 | End: 2021-06-09 | Stop reason: HOSPADM

## 2021-06-09 RX ORDER — BUPIVACAINE HYDROCHLORIDE 2.5 MG/ML
INJECTION, SOLUTION EPIDURAL; INFILTRATION; INTRACAUDAL AS NEEDED
Status: DISCONTINUED | OUTPATIENT
Start: 2021-06-09 | End: 2021-06-09 | Stop reason: HOSPADM

## 2021-06-09 RX ORDER — MIDAZOLAM HYDROCHLORIDE 2 MG/2ML
INJECTION, SOLUTION INTRAMUSCULAR; INTRAVENOUS AS NEEDED
Status: DISCONTINUED | OUTPATIENT
Start: 2021-06-09 | End: 2021-06-09

## 2021-06-09 RX ORDER — PROPOFOL 10 MG/ML
INJECTION, EMULSION INTRAVENOUS AS NEEDED
Status: DISCONTINUED | OUTPATIENT
Start: 2021-06-09 | End: 2021-06-09

## 2021-06-09 RX ORDER — ONDANSETRON 2 MG/ML
4 INJECTION INTRAMUSCULAR; INTRAVENOUS ONCE AS NEEDED
Status: DISCONTINUED | OUTPATIENT
Start: 2021-06-09 | End: 2021-06-09 | Stop reason: HOSPADM

## 2021-06-09 RX ORDER — ACETAMINOPHEN 325 MG/1
975 TABLET ORAL EVERY 6 HOURS SCHEDULED
Status: DISCONTINUED | OUTPATIENT
Start: 2021-06-09 | End: 2021-06-10 | Stop reason: HOSPADM

## 2021-06-09 RX ORDER — MAGNESIUM HYDROXIDE 1200 MG/15ML
LIQUID ORAL AS NEEDED
Status: DISCONTINUED | OUTPATIENT
Start: 2021-06-09 | End: 2021-06-09 | Stop reason: HOSPADM

## 2021-06-09 RX ORDER — CEFAZOLIN SODIUM 2 G/50ML
2000 SOLUTION INTRAVENOUS ONCE
Status: COMPLETED | OUTPATIENT
Start: 2021-06-09 | End: 2021-06-09

## 2021-06-09 RX ORDER — SCOLOPAMINE TRANSDERMAL SYSTEM 1 MG/1
1 PATCH, EXTENDED RELEASE TRANSDERMAL
Status: DISCONTINUED | OUTPATIENT
Start: 2021-06-09 | End: 2021-06-09 | Stop reason: HOSPADM

## 2021-06-09 RX ORDER — OXYCODONE HYDROCHLORIDE 10 MG/1
10 TABLET ORAL EVERY 4 HOURS PRN
Status: DISCONTINUED | OUTPATIENT
Start: 2021-06-09 | End: 2021-06-10 | Stop reason: HOSPADM

## 2021-06-09 RX ORDER — DEXAMETHASONE SODIUM PHOSPHATE 10 MG/ML
INJECTION, SOLUTION INTRAMUSCULAR; INTRAVENOUS AS NEEDED
Status: DISCONTINUED | OUTPATIENT
Start: 2021-06-09 | End: 2021-06-09

## 2021-06-09 RX ORDER — OXYCODONE HYDROCHLORIDE 5 MG/1
5 TABLET ORAL EVERY 4 HOURS PRN
Status: DISCONTINUED | OUTPATIENT
Start: 2021-06-09 | End: 2021-06-10 | Stop reason: HOSPADM

## 2021-06-09 RX ORDER — KETAMINE HCL IN NACL, ISO-OSM 100MG/10ML
SYRINGE (ML) INJECTION AS NEEDED
Status: DISCONTINUED | OUTPATIENT
Start: 2021-06-09 | End: 2021-06-09

## 2021-06-09 RX ORDER — ACETAMINOPHEN 325 MG/1
975 TABLET ORAL ONCE
Status: COMPLETED | OUTPATIENT
Start: 2021-06-09 | End: 2021-06-09

## 2021-06-09 RX ORDER — SODIUM CHLORIDE, SODIUM LACTATE, POTASSIUM CHLORIDE, CALCIUM CHLORIDE 600; 310; 30; 20 MG/100ML; MG/100ML; MG/100ML; MG/100ML
20 INJECTION, SOLUTION INTRAVENOUS CONTINUOUS
Status: DISCONTINUED | OUTPATIENT
Start: 2021-06-09 | End: 2021-06-10 | Stop reason: HOSPADM

## 2021-06-09 RX ORDER — ONDANSETRON 2 MG/ML
INJECTION INTRAMUSCULAR; INTRAVENOUS AS NEEDED
Status: DISCONTINUED | OUTPATIENT
Start: 2021-06-09 | End: 2021-06-09

## 2021-06-09 RX ORDER — NEOSTIGMINE METHYLSULFATE 1 MG/ML
INJECTION INTRAVENOUS AS NEEDED
Status: DISCONTINUED | OUTPATIENT
Start: 2021-06-09 | End: 2021-06-09

## 2021-06-09 RX ORDER — FENTANYL CITRATE 50 UG/ML
INJECTION, SOLUTION INTRAMUSCULAR; INTRAVENOUS AS NEEDED
Status: DISCONTINUED | OUTPATIENT
Start: 2021-06-09 | End: 2021-06-09

## 2021-06-09 RX ORDER — GLYCOPYRROLATE 0.2 MG/ML
INJECTION INTRAMUSCULAR; INTRAVENOUS AS NEEDED
Status: DISCONTINUED | OUTPATIENT
Start: 2021-06-09 | End: 2021-06-09

## 2021-06-09 RX ORDER — SUCCINYLCHOLINE/SOD CL,ISO/PF 100 MG/5ML
SYRINGE (ML) INTRAVENOUS AS NEEDED
Status: DISCONTINUED | OUTPATIENT
Start: 2021-06-09 | End: 2021-06-09

## 2021-06-09 RX ORDER — FENTANYL CITRATE/PF 50 MCG/ML
25 SYRINGE (ML) INJECTION
Status: DISCONTINUED | OUTPATIENT
Start: 2021-06-09 | End: 2021-06-09 | Stop reason: HOSPADM

## 2021-06-09 RX ORDER — ROCURONIUM BROMIDE 10 MG/ML
INJECTION, SOLUTION INTRAVENOUS AS NEEDED
Status: DISCONTINUED | OUTPATIENT
Start: 2021-06-09 | End: 2021-06-09

## 2021-06-09 RX ADMIN — LIDOCAINE HYDROCHLORIDE 50 MG: 10 INJECTION, SOLUTION EPIDURAL; INFILTRATION; INTRACAUDAL; PERINEURAL at 10:18

## 2021-06-09 RX ADMIN — ACETAMINOPHEN 975 MG: 325 TABLET, FILM COATED ORAL at 09:22

## 2021-06-09 RX ADMIN — HYDROMORPHONE HYDROCHLORIDE 0.2 MG: 1 INJECTION, SOLUTION INTRAMUSCULAR; INTRAVENOUS; SUBCUTANEOUS at 12:02

## 2021-06-09 RX ADMIN — HYDROMORPHONE HYDROCHLORIDE 0.5 MG: 1 INJECTION, SOLUTION INTRAMUSCULAR; INTRAVENOUS; SUBCUTANEOUS at 08:03

## 2021-06-09 RX ADMIN — FENTANYL CITRATE 100 MCG: 50 INJECTION, SOLUTION INTRAMUSCULAR; INTRAVENOUS at 10:18

## 2021-06-09 RX ADMIN — OXYCODONE HYDROCHLORIDE 10 MG: 10 TABLET ORAL at 15:21

## 2021-06-09 RX ADMIN — Medication 10 MG: at 10:56

## 2021-06-09 RX ADMIN — ACETAMINOPHEN 975 MG: 325 TABLET, FILM COATED ORAL at 23:37

## 2021-06-09 RX ADMIN — ACETAMINOPHEN 975 MG: 325 TABLET, FILM COATED ORAL at 15:24

## 2021-06-09 RX ADMIN — FENTANYL CITRATE 25 MCG: 50 INJECTION INTRAMUSCULAR; INTRAVENOUS at 11:31

## 2021-06-09 RX ADMIN — HYDROMORPHONE HYDROCHLORIDE 0.5 MG: 1 INJECTION, SOLUTION INTRAMUSCULAR; INTRAVENOUS; SUBCUTANEOUS at 14:21

## 2021-06-09 RX ADMIN — PROPOFOL 200 MG: 10 INJECTION, EMULSION INTRAVENOUS at 10:18

## 2021-06-09 RX ADMIN — HYDROMORPHONE HYDROCHLORIDE 0.2 MG: 1 INJECTION, SOLUTION INTRAMUSCULAR; INTRAVENOUS; SUBCUTANEOUS at 12:43

## 2021-06-09 RX ADMIN — ROCURONIUM BROMIDE 20 MG: 10 SOLUTION INTRAVENOUS at 10:25

## 2021-06-09 RX ADMIN — HYDROMORPHONE HYDROCHLORIDE 0.2 MG: 1 INJECTION, SOLUTION INTRAMUSCULAR; INTRAVENOUS; SUBCUTANEOUS at 11:56

## 2021-06-09 RX ADMIN — ACETAMINOPHEN 650 MG: 325 TABLET, FILM COATED ORAL at 08:03

## 2021-06-09 RX ADMIN — MIDAZOLAM HYDROCHLORIDE 2 MG: 1 INJECTION, SOLUTION INTRAMUSCULAR; INTRAVENOUS at 10:10

## 2021-06-09 RX ADMIN — DEXAMETHASONE SODIUM PHOSPHATE 4 MG: 10 INJECTION, SOLUTION INTRAMUSCULAR; INTRAVENOUS at 10:23

## 2021-06-09 RX ADMIN — GLYCOPYRROLATE 0.4 MG: 0.2 INJECTION, SOLUTION INTRAMUSCULAR; INTRAVENOUS at 10:58

## 2021-06-09 RX ADMIN — HYDROMORPHONE HYDROCHLORIDE 0.5 MG: 1 INJECTION, SOLUTION INTRAMUSCULAR; INTRAVENOUS; SUBCUTANEOUS at 01:25

## 2021-06-09 RX ADMIN — SODIUM CHLORIDE, SODIUM LACTATE, POTASSIUM CHLORIDE, AND CALCIUM CHLORIDE 100 ML/HR: .6; .31; .03; .02 INJECTION, SOLUTION INTRAVENOUS at 04:43

## 2021-06-09 RX ADMIN — SODIUM CHLORIDE, SODIUM LACTATE, POTASSIUM CHLORIDE, AND CALCIUM CHLORIDE 20 ML/HR: .6; .31; .03; .02 INJECTION, SOLUTION INTRAVENOUS at 20:55

## 2021-06-09 RX ADMIN — ONDANSETRON 4 MG: 2 INJECTION INTRAMUSCULAR; INTRAVENOUS at 10:23

## 2021-06-09 RX ADMIN — HYDROMORPHONE HYDROCHLORIDE 0.5 MG: 1 INJECTION, SOLUTION INTRAMUSCULAR; INTRAVENOUS; SUBCUTANEOUS at 04:41

## 2021-06-09 RX ADMIN — SODIUM CHLORIDE, SODIUM LACTATE, POTASSIUM CHLORIDE, AND CALCIUM CHLORIDE: .6; .31; .03; .02 INJECTION, SOLUTION INTRAVENOUS at 10:56

## 2021-06-09 RX ADMIN — CEFAZOLIN SODIUM 2000 MG: 2 SOLUTION INTRAVENOUS at 05:34

## 2021-06-09 RX ADMIN — Medication 100 MG: at 10:18

## 2021-06-09 RX ADMIN — Medication 20 MG: at 10:22

## 2021-06-09 RX ADMIN — SCOPALAMINE 1 PATCH: 1 PATCH, EXTENDED RELEASE TRANSDERMAL at 09:23

## 2021-06-09 RX ADMIN — HYDROMORPHONE HYDROCHLORIDE 0.5 MG: 1 INJECTION, SOLUTION INTRAMUSCULAR; INTRAVENOUS; SUBCUTANEOUS at 20:49

## 2021-06-09 RX ADMIN — ONDANSETRON 4 MG: 2 INJECTION INTRAMUSCULAR; INTRAVENOUS at 01:25

## 2021-06-09 RX ADMIN — NEOSTIGMINE METHYLSULFATE 3 MG: 1 INJECTION INTRAVENOUS at 10:58

## 2021-06-09 RX ADMIN — OXYCODONE HYDROCHLORIDE 10 MG: 10 TABLET ORAL at 23:38

## 2021-06-09 RX ADMIN — CEFAZOLIN SODIUM 2000 MG: 2 SOLUTION INTRAVENOUS at 09:59

## 2021-06-09 NOTE — OP NOTE
OPERATIVE REPORT  PATIENT NAME: Vikas Donato    :  1990  MRN: [id number]  Pt Location: [facility] OR ROOM 03    SURGERY DATE: 2021    Surgeon(s) and Role:     * Alexandria Garcia MD - Primary     * Jose Hood PA-C - Assisting    Preop Diagnosis:  Acute calculous cholecystitis [K81 0]    Post-Op Diagnosis Codes:     * Acute calculous cholecystitis [K81 0]  Hydrops of the gallbladder    Procedure(s) (LRB):  CHOLECYSTECTOMY LAPAROSCOPIC (N/A)    Specimen(s):  ID Type Source Tests Collected by Time Destination   1 : GALLBLADDER  Tissue Gallbladder TISSUE EXAM Alexandria Garcia MD 2021 1043        Estimated Blood Loss:   Minimal    Drains:  None    Anesthesia Type:   General    Operative Indications:  Acute calculous cholecystitis [K81 0]    Operative Findings:  Gallbladder was markedly distended with edema of the wall throughout and multiple adhesions to the surrounding fatty tissue  Liver surface appeared normal   Some adhesions on the right lower quadrant from prior C-sections  The rest of the abdominal cavity showed no evidence of inflammatory or neoplastic process  Complications:   None    Procedure and Technique:    The patient was identified and she was placed in the operating table in a supine position  After adequate anesthesia induction and satisfactory endotracheal intubation the abdomen was prepped and draped under sterile usual fashion with ChloraPrep  Time-out was called the patient was identified as was surgical site  The abdominal wall was elevated with towel clips, an incision was made through the umbilicus and 5 mm trocar was introduced  After verifying the position and the abdomen was insufflated with CO2  After obtaining adequate pneumoperitoneum the scope was advanced and exploration was performed with above findings  11 mm trocar was placed in the epigastric area and 5 mm trocar in the midclavicular and anterior axillary line under direct vision       The fundus of the gallbladder was grasped and pulled towards the right shoulder  The gallbladder was aspirated obtaining clear fluid  At this point all the adhesions were carefully taken down between the surrounding fatty tissue and gallbladder using the dissector and cautery  Patience's pouch was grasped and pulled towards the right side  The cystic duct was identified, dissected and  stapled proximally and distally then divided with scissors  The cystic artery was also identified, dissected, stapled proximally and distally and then divided with scissors  The gallbladder was removed from the gallbladder fossa using electrocautery and the hook  The gallbladder was retrieved through the epigastric port site with the help of the Endo Catch  The abdominal cavity was copiously irrigated with saline solution  The gallbladder fossa was dry  The cystic duct and cystic artery were inspected with no evidence of bile leak or bleeding respectively  The ports were removed under direct vision without evidence of bleeding from the abdominal wall  The epigastric port site fascia was closed with 0 Vicryl in an interrupted figure-of-eight fashion  The subcutaneous tissue was infiltrated with 0 25% Marcaine and the skin was closed with a 4-0 Vicryl in an interrupted significant fashion  Sterile dressings were applied  At the end of the case instrument, needles, sponges counts were correct  The patient tolerated the procedure well and then he was transferred to recovery room in a stable conditions       I was present for the entire procedure, A qualified resident physician was not available and A physician assistant was required during the procedure for retraction tissue handling,dissection and suturing    Patient Disposition:  PACU , hemodynamically stable and extubated and stable    SIGNATURE: Veto Zarco MD  DATE: June 9, 2021  TIME: 10:59 AM

## 2021-06-09 NOTE — CASE MANAGEMENT
Cm attempted to meet with patient and officer in room  Patient and officer down in the [address] now  Cm to come back to confirm DCP  Cm department will continue to follow patient through discharge

## 2021-06-09 NOTE — PLAN OF CARE
Problem: Potential for Falls  Goal: Patient will remain free of falls  Description: INTERVENTIONS:  - Assess patient frequently for physical needs  -  Identify cognitive and physical deficits and behaviors that affect risk of falls  -  Dixie fall precautions as indicated by assessment   - Educate patient/family on patient safety including physical limitations  - Instruct patient to call for assistance with activity based on assessment  - Modify environment to reduce risk of injury  - Consider OT/PT consult to assist with strengthening/mobility  Outcome: Progressing     Problem: PAIN - ADULT  Goal: Verbalizes/displays adequate comfort level or baseline comfort level  Description: Interventions:  - Encourage patient to monitor pain and request assistance  - Assess pain using appropriate pain scale  - Administer analgesics based on type and severity of pain and evaluate response  - Implement non-pharmacological measures as appropriate and evaluate response  - Consider cultural and social influences on pain and pain management  - Notify physician/advanced practitioner if interventions unsuccessful or patient reports new pain  Outcome: Progressing     Problem: SAFETY ADULT  Goal: Patient will remain free of falls  Description: INTERVENTIONS:  - Assess patient frequently for physical needs  -  Identify cognitive and physical deficits and behaviors that affect risk of falls    -  Dixie fall precautions as indicated by assessment   - Educate patient/family on patient safety including physical limitations  - Instruct patient to call for assistance with activity based on assessment  - Modify environment to reduce risk of injury  - Consider OT/PT consult to assist with strengthening/mobility  Outcome: Progressing

## 2021-06-09 NOTE — ANESTHESIA POSTPROCEDURE EVALUATION
Post-Op Assessment Note    CV Status:  Stable    Pain management: adequate     Mental Status:  Alert and sleepy   Hydration Status:  Euvolemic   PONV Controlled:  Controlled   Airway Patency:  Patent      Post Op Vitals Reviewed: Yes      Staff: CRNA         No complications documented      BP   118/74   Temp   98 2   Pulse  70   Resp   20   SpO2   99

## 2021-06-09 NOTE — UTILIZATION REVIEW
Initial Clinical Review    Admission: Date/Time/Statement:   Admission Orders: Observation 6/8 @ 1401 and change to Outpatient No Charge Bed for Outpatient Surgery     Ordered        06/08/21 1401  Place in Observation  Once                   Orders Placed This Encounter   Procedures    Place in Observation     Standing Status:   Standing     Number of Occurrences:   1     Order Specific Question:   Level of Care     Answer:   Med Surg [16]     ED Arrival Information     Expected Arrival Acuity Means of Arrival Escorted By Service Admission Type    - 6/8/2021 10:35 Urgent [facility] Urgent    Arrival Complaint    Abdominal Pain        Chief Complaint   Patient presents with    Abdominal Pain     Pt [facility] from [facility], c/o right sided abd pain that began around 0300  +N/V Reports she is also detoxing from Meth/Heroin  Initial Presentation:   33y Female to ED presents with acute onset abdominal pain x1 day, nausea and emesis  In addition pt notes; intermittent mild post-prandial pain associated with large meals in the past that she never sought medical attention for this  In ED, CT Abd/Pelvis showed an abnormal appearance of the gallbladder with distention and pericholecystic edema suggesting acute cholecystitis  Active IVDU; uses heroin and methamphetamines, last used 5 days ago  PMH for C-sections x3 and tubal ligation  Admit Observation level of car for Acute cholecystitis  Iv antibiotics  Serial abdominal exams  Trend labs, mild leukocytosis  Pain control  6/9 OR - S/p CHOLECYSTECTOMY LAPAROSCOPIC (N/A)  Operative Findings:  Gallbladder was markedly distended with edema of the wall throughout and multiple adhesions to the surrounding fatty tissue  Liver surface appeared normal   Some adhesions on the right lower quadrant from prior C-sections  The rest of the abdominal cavity showed no evidence of inflammatory or neoplastic process      ED Triage Vitals [06/08/21 1036] Temperature Pulse Respirations Blood Pressure SpO2   98 3 °F (36 8 °C) 55 17 115/79 100 %      Temp Source Heart Rate Source Patient Position - Orthostatic VS BP Location FiO2 (%)   Oral Monitor Lying Right arm --      Pain Score       8          Wt Readings from Last 1 Encounters:   06/08/21 73 2 kg (161 lb 6 oz)     Additional Vital Signs:   06/09/21 08:11:19  97 8 °F (36 6 °C)  --  --  105/62  76  --  --  --   06/09/21 0803  --  --  --  --  --  --  None (Room air)  --   06/08/21 23:57:29  98 8 °F (37 1 °C)  --  16  122/72  89  --  --  --   06/08/21 20:17:01  98 8 °F (37 1 °C)  52Abnormal   16  110/70  83  98 %  None (Room air)  Lying   06/08/21 1842  --  53Abnormal   18  119/74  --  99 %  None (Room air)  --   06/08/21 1511  --  51Abnormal   15  120/72  --  100 %  None (Room air)  --   06/08/21 1323  --  62  17  122/79  --  98 %  None (Room air)       Pertinent Labs/Diagnostic Test Results:   6/8  CT Abd/Pelvis - Abnormal appearance of the gallbladder with distention and pericholecystic edema  The appearance may suggest cholecystitis  Consider follow-up ultrasound  US Gallbladder - Abnormal study  Cholelithiasis with thickening of the gallbladder wall, pericholecystic edema suggestive of acute cholecystitis  There is a positive sonographic Robertson's sign    Common bile duct mildly distended at 7 mm        Results from last 7 days   Lab Units 06/09/21  0530 06/08/21  1122   WBC Thousand/uL 9 95 12 78*   HEMOGLOBIN g/dL 12 8 14 0   HEMATOCRIT % 39 0 43 6   PLATELETS Thousands/uL 246 347   NEUTROS ABS Thousands/µL 4 89 8 07*         Results from last 7 days   Lab Units 06/09/21  0530 06/08/21  1122   SODIUM mmol/L 138 140   POTASSIUM mmol/L 3 9 3 9   CHLORIDE mmol/L 103 101   CO2 mmol/L 28 29   ANION GAP mmol/L 7 10   BUN mg/dL 10 14   CREATININE mg/dL 0 74 0 82   EGFR ml/min/1 73sq m 108 96   CALCIUM mg/dL 8 4 8 9     Results from last 7 days   Lab Units 06/08/21  1122   AST U/L 16   ALT U/L 47   ALK PHOS U/L 103   TOTAL PROTEIN g/dL 8 2   ALBUMIN g/dL 3 8   TOTAL BILIRUBIN mg/dL 0 47   BILIRUBIN DIRECT mg/dL 0 13         Results from last 7 days   Lab Units 06/09/21  0530 06/08/21  1122   GLUCOSE RANDOM mg/dL 86 101             No results found for: BETA-HYDROXYBUTYRATE                                       Results from last 7 days   Lab Units 06/08/21  1122   LACTIC ACID mmol/L 1 0       Results from last 7 days   Lab Units 06/08/21  1115   CLARITY UA  Turbid   COLOR UA  Yellow   SPEC GRAV UA  >=1 030   PH UA  6 0   GLUCOSE UA mg/dl Negative   KETONES UA mg/dl Negative   BLOOD UA  Negative   PROTEIN UA mg/dl Trace*   NITRITE UA  Negative   BILIRUBIN UA  Negative   UROBILINOGEN UA E U /dl 1 0   LEUKOCYTES UA  Trace*   WBC UA /hpf 1-2   RBC UA /hpf None Seen   BACTERIA UA /hpf Occasional   EPITHELIAL CELLS WET PREP /hpf Moderate*       ED Treatment:   Medication Administration from 06/08/2021 1035 to 06/08/2021 2004       Date/Time Order Dose Route Action     06/08/2021 1122 lactated ringers bolus 1,000 mL 1,000 mL Intravenous New Bag     06/08/2021 1122 ondansetron (ZOFRAN) injection 4 mg 4 mg Intravenous Given     06/08/2021 1122 ketorolac (TORADOL) injection 15 mg 15 mg Intravenous Given     06/08/2021 1217 iohexol (OMNIPAQUE) 350 MG/ML injection (SINGLE-DOSE) 100 mL 100 mL Intravenous Given     06/08/2021 1323 morphine (PF) 4 mg/mL injection 4 mg 4 mg Intravenous Given     06/08/2021 1323 ondansetron (ZOFRAN) injection 4 mg 4 mg Intravenous Given     06/08/2021 1842 ondansetron (ZOFRAN) injection 4 mg 4 mg Intravenous Given     06/08/2021 1504 sodium chloride 0 9 % infusion 50 mL/hr Intravenous New Bag     06/08/2021 1506 nicotine (NICODERM CQ) 14 mg/24hr TD 24 hr patch 1 patch 1 patch Transdermal Medication Applied     06/08/2021 1842 HYDROmorphone (DILAUDID) injection 0 5 mg 0 5 mg Intravenous Given     06/08/2021 1511 HYDROmorphone (DILAUDID) injection 0 5 mg 0 5 mg Intravenous Given     06/08/2021 1505 ceFAZolin (ANCEF) IVPB (premix in dextrose) 2,000 mg 50 mL 2,000 mg Intravenous New Bag     06/08/2021 1601 lactated ringers infusion 100 mL/hr Intravenous New Bag        History reviewed  No pertinent past medical history  Present on Admission:  **None**      Admitting Diagnosis: Acute cholecystitis [K81 0]  Abdominal pain [R10 9]  Right upper quadrant pain [R10 11]  Age/Sex: 31 y o  female     Admission Orders:  Scheduled Medications:  acetaminophen, 975 mg, Oral, Once  cefazolin, 2,000 mg, Intravenous, Q8H  enoxaparin, 40 mg, Subcutaneous, Daily  nicotine, 1 patch, Transdermal, Daily  scopolamine, 1 patch, Transdermal, Q72H      Continuous IV Infusions:  lactated ringers, 100 mL/hr, Intravenous, Continuous      PRN Meds:  acetaminophen, 650 mg, Oral, Q4H PRN  HYDROmorphone, 0 5 mg, Intravenous, Q3H PRN  ondansetron, 4 mg, Intravenous, Q6H PRN      Network Utilization Review Department  ATTENTION: Please call with any questions or concerns to [phone] and carefully listen to the prompts so that you are directed to the right person  All voicemails are confidential   Valshay Hennessy all requests for admission clinical reviews, approved or denied determinations and any other requests to dedicated fax number below belonging to the campus where the patient is receiving treatment   List of dedicated fax numbers for the Facilities:  [address] DENIALS (Administrative/Medical Necessity) [phone]   [address] (Maternity/NICU/Pediatrics) [phone]   [address] [address] [facility] Sam Kotazoey Collins 1277 [id number] [facility] [address] [place] [address] [address] [facility] [address] [place] [address] [phone]

## 2021-06-09 NOTE — PLAN OF CARE
Problem: Potential for Falls  Goal: Patient will remain free of falls  Description: INTERVENTIONS:  - Assess patient frequently for physical needs  -  Identify cognitive and physical deficits and behaviors that affect risk of falls    -  [place] fall precautions as indicated by assessment   - Educate patient/family on patient safety including physical limitations  - Instruct patient to call for assistance with activity based on assessment  - Modify environment to reduce risk of injury  - Consider OT/PT consult to assist with strengthening/mobility  6/9/2021 0820 by Poly Phillips RN  Outcome: Progressing  6/9/2021 0820 by Poly Phillips RN  Outcome: Progressing

## 2021-06-09 NOTE — ANESTHESIA PREPROCEDURE EVALUATION
Procedure:  CHOLECYSTECTOMY LAPAROSCOPIC (N/A Abdomen)    Relevant Problems   No relevant active problems     Smoking Status: Current Every Day Smoker   Smokeless Tobacco Status: Never Used   Alcohol use: Never   Drug use: Heroin, Marijuana, Methamphetamines          Physical Exam    Airway    Mallampati score: II  TM Distance: >3 FB  Neck ROM: full     Dental       Cardiovascular  Cardiovascular exam normal    Pulmonary  Pulmonary exam normal     Other Findings        Anesthesia Plan  ASA Score- 3     Anesthesia Type- general with ASA Monitors  Additional Monitors:   Airway Plan: ETT  Plan Factors-Exercise tolerance (METS): >4 METS  Chart reviewed  Existing labs reviewed  Patient summary reviewed  Patient is a current smoker  Patient instructed to abstain from smoking on day of procedure  Induction- intravenous  Postoperative Plan- Plan for postoperative opioid use  Planned trial extubation    Informed Consent- Anesthetic plan and risks discussed with patient  I personally reviewed this patient with the CRNA  Discussed and agreed on the Anesthesia Plan with the CRNA  Selina Emerson is a 31 y o  female who presents with acute onset abdominal pain  The patient states the pain started suddenly this AM  She states the pain is primarily in RUQ and that it has worsened since onset which prompted her to go to the ED  CT of the abdomen and pelvis showed an abnormal appearance of the gallbladder with distention and pericholecystic edema suggesting acute cholecystitis At this time, the patient still notes significant abdominal pain  She also notes some nausea and emesis  Patient denies any prior history of similar pain, but does note that she has had intermittent mild post-prandial pain associated with large meals in the past that she never sought medical attention for  Prior medical history includes active IVDU  Patient reports usage of heroin and methamphetamines   She states she last used approximately 5 days ago  She denies any alcohol usage  She is a current 0 5 pack/day smoker  She denies any other medical history  Prior surgical history includes C-sections x3 and tubal ligation

## 2021-06-10 VITALS
HEART RATE: 50 BPM | DIASTOLIC BLOOD PRESSURE: 61 MMHG | SYSTOLIC BLOOD PRESSURE: 111 MMHG | HEIGHT: 64 IN | OXYGEN SATURATION: 97 % | TEMPERATURE: 97.8 F | WEIGHT: 161.38 LBS | BODY MASS INDEX: 27.55 KG/M2 | RESPIRATION RATE: 17 BRPM

## 2021-06-10 LAB
ALBUMIN SERPL BCP-MCNC: 3.2 G/DL (ref 3.5–5)
ALP SERPL-CCNC: 98 U/L (ref 46–116)
ALT SERPL W P-5'-P-CCNC: 30 U/L (ref 12–78)
ANION GAP SERPL CALCULATED.3IONS-SCNC: 11 MMOL/L (ref 4–13)
AST SERPL W P-5'-P-CCNC: 17 U/L (ref 5–45)
BASOPHILS # BLD AUTO: 0.04 THOUSANDS/ΜL (ref 0–0.1)
BASOPHILS NFR BLD AUTO: 0 % (ref 0–1)
BILIRUB SERPL-MCNC: 0.42 MG/DL (ref 0.2–1)
BUN SERPL-MCNC: 10 MG/DL (ref 5–25)
CALCIUM ALBUM COR SERPL-MCNC: 9.2 MG/DL (ref 8.3–10.1)
CALCIUM SERPL-MCNC: 8.6 MG/DL (ref 8.3–10.1)
CHLORIDE SERPL-SCNC: 102 MMOL/L (ref 100–108)
CO2 SERPL-SCNC: 27 MMOL/L (ref 21–32)
CREAT SERPL-MCNC: 0.8 MG/DL (ref 0.6–1.3)
EOSINOPHIL # BLD AUTO: 0.07 THOUSAND/ΜL (ref 0–0.61)
EOSINOPHIL NFR BLD AUTO: 1 % (ref 0–6)
ERYTHROCYTE [DISTWIDTH] IN BLOOD BY AUTOMATED COUNT: 12.5 % (ref 11.6–15.1)
GFR SERPL CREATININE-BSD FRML MDRD: 99 ML/MIN/1.73SQ M
GLUCOSE P FAST SERPL-MCNC: 87 MG/DL (ref 65–99)
GLUCOSE SERPL-MCNC: 87 MG/DL (ref 65–140)
HCT VFR BLD AUTO: 40 % (ref 34.8–46.1)
HGB BLD-MCNC: 13.1 G/DL (ref 11.5–15.4)
IMM GRANULOCYTES # BLD AUTO: 0.04 THOUSAND/UL (ref 0–0.2)
IMM GRANULOCYTES NFR BLD AUTO: 0 % (ref 0–2)
LYMPHOCYTES # BLD AUTO: 4.31 THOUSANDS/ΜL (ref 0.6–4.47)
LYMPHOCYTES NFR BLD AUTO: 33 % (ref 14–44)
MCH RBC QN AUTO: 29.1 PG (ref 26.8–34.3)
MCHC RBC AUTO-ENTMCNC: 32.8 G/DL (ref 31.4–37.4)
MCV RBC AUTO: 89 FL (ref 82–98)
MONOCYTES # BLD AUTO: 0.78 THOUSAND/ΜL (ref 0.17–1.22)
MONOCYTES NFR BLD AUTO: 6 % (ref 4–12)
NEUTROPHILS # BLD AUTO: 7.76 THOUSANDS/ΜL (ref 1.85–7.62)
NEUTS SEG NFR BLD AUTO: 60 % (ref 43–75)
NRBC BLD AUTO-RTO: 0 /100 WBCS
PLATELET # BLD AUTO: 281 THOUSANDS/UL (ref 149–390)
PMV BLD AUTO: 10.9 FL (ref 8.9–12.7)
POTASSIUM SERPL-SCNC: 4.2 MMOL/L (ref 3.5–5.3)
PROT SERPL-MCNC: 7.2 G/DL (ref 6.4–8.2)
RBC # BLD AUTO: 4.5 MILLION/UL (ref 3.81–5.12)
SODIUM SERPL-SCNC: 140 MMOL/L (ref 136–145)
WBC # BLD AUTO: 13 THOUSAND/UL (ref 4.31–10.16)

## 2021-06-10 PROCEDURE — 80053 COMPREHEN METABOLIC PANEL: CPT | Performed by: PHYSICIAN ASSISTANT

## 2021-06-10 PROCEDURE — 99024 POSTOP FOLLOW-UP VISIT: CPT | Performed by: SURGERY

## 2021-06-10 PROCEDURE — 85025 COMPLETE CBC W/AUTO DIFF WBC: CPT | Performed by: PHYSICIAN ASSISTANT

## 2021-06-10 RX ORDER — IBUPROFEN 600 MG/1
600 TABLET ORAL EVERY 6 HOURS PRN
Qty: 30 TABLET | Refills: 0 | COMMUNITY
Start: 2021-06-10

## 2021-06-10 RX ORDER — ACETAMINOPHEN AND CODEINE PHOSPHATE 300; 30 MG/1; MG/1
1 TABLET ORAL EVERY 4 HOURS PRN
Qty: 10 TABLET | Refills: 0 | Status: SHIPPED | OUTPATIENT
Start: 2021-06-10

## 2021-06-10 RX ADMIN — OXYCODONE HYDROCHLORIDE 10 MG: 10 TABLET ORAL at 09:38

## 2021-06-10 RX ADMIN — OXYCODONE HYDROCHLORIDE 10 MG: 10 TABLET ORAL at 13:43

## 2021-06-10 RX ADMIN — ENOXAPARIN SODIUM 40 MG: 40 INJECTION SUBCUTANEOUS at 09:38

## 2021-06-10 RX ADMIN — OXYCODONE HYDROCHLORIDE 10 MG: 10 TABLET ORAL at 05:27

## 2021-06-10 RX ADMIN — NICOTINE 1 PATCH: 14 PATCH, EXTENDED RELEASE TRANSDERMAL at 09:38

## 2021-06-10 NOTE — DISCHARGE INSTRUCTIONS
-Alternate 600 mg ibuprofen and 650 mg Tylenol every 3 hours  -If pain is severe and uncontrolled take prescribed pain medication as needed  Do not take with Tylenol as it also contains acetaminophen  DISCHARGE INSTRUCTIONS:  FOLLOW UP APPOINTMENT: Following discharge from the hospital call the office in 1-2 days to set up a post operative appointment to be seen in 2 weeks by Dr Deepa Adler: Following discharge from the hospital, you may have some questions about your procedure, your activities or your general condition  These instructions may answer some of your questions and help you adjust during the first few days following your operation  You can expect to be sore and tender mostly around the incisions  This pain should last approximally 5 days and gradually improve daily  INCISION SITES:  - You may apply ice to the incisions to help with pain  Avoid heat as this may make skin glue tacky  - It is normal to have some bruising, swelling or mild discoloration around the incision  If increasing redness or pain develops, call our office immediately    -Do not apply any creams, lotions, or ointments  If you have dressings:  - You may remove the gauze dressing from your incisions 48 hours after surgery  Underneath this dressing is a tape like dressing called Steri Strips  Leave the steri strips in place for 5-7 days  They may fall off on their own, this is ok  If you have surgical adhesive glue:  - The incisions are closed with dissolvable sutures underneath the skin and a surgical adhesive glue overlying the skin  - Do not pick at the adhesive  It will naturally wear off with time  WOUND CARE:  -Leave steri-strips on, allow to fall off naturally    Avoid tight adhering, irritative clothing   -You may gently shower, let water and soap run down and pat dry; no scrubbing the incision sites    -No baths, hot tubs, or swimming x 6 weeks or until cleared after follow up appointment  PAIN CONTROL/MEDICATIONS:  -Recommend taking over the counter pain medication such as Tylenol, Aleve OR Ibuprofen first; read and follow labels  You may alternate Tylenol and Ibuprofen every 3 hours   -Only use prescribed pain medications as needed and try to taper down use overtime  Do not drive or operate heavy machinery while on prescription pain medications  Do not take with alcohol   - If you were given a prescription for Percocet, Norco, or Vicodin for pain be sure to eat prior to taking as these medications as they may cause nausea and vomiting on an empty stomach  -DO NOT take Tylenol  (acetaminophen) with prescribed pain medication for a fever or for further pain control as these medications already contain Tylenol in them  Take one or the other  Do not exceed more than 4000 mg of acetaminophen in 24 hours or 3000 mg if you have liver disease    -You may apply ice at the incision site as needed for 20-30 minutes on/off to decrease pain, swelling the first few days  - If you were given an antibiotic take it until it is finished  DIET/LIFE HABITS:  -Advance diet as tolerated  Start with bland foods  Once tolerating normal diet, include fiber-rich foods such as fruits and vegetables to help with bowel movements   -Stay hydrated  Drink plenty of non-caffienated, non-alcoholic beverages such as water, juices, popsicles, etc   -Stay away from alcohol as this can interrupt wound healing and increase chance of unwanted bleeding    -No smoking at least 2 weeks post surgery, this can delay wound healing  Smoking cessation is encouraged and we can provide you with options to facilitate cessation   -If you are having constipation, ensure fiber intake, stay active, and if requiring more, you may take over the counter stool softners as needed      ACTIVITY/RESTRICTIONS:  -No strenuous exercise and no heavy lifting, pulling, or pushing >10-15 lbs x 4 weeks or until cleared by surgeon   -The evening following the procedure you should rest as much as possible, sitting, lying or reclining  You should be sure someone remains with you until the next morning  Gradually increase your activity daily  Walking 3-4 times daily is good and stairs are ok  Listen to your body  If you start to get tired or sore then rest    -No driving for 5 days or while taking narcotics for pain  RETURN TO WORK:  -You may return to work or other activities as soon as your pain is controlled and you feel comfortable  For many people, this is 5 to 7 days after surgery  If your job requires heavy lifting you will need to be on light duty for 2-3 weeks  CALL THE OFFICE IF/RETURN TO ED:  -Fevers/chills with uncontrolled temperature > 100 4 F   -Increasing severe pain uncontrolled with pain medicine   -Incision site is having thick, yellow drainage, increasing redness, is warm to the touch, or becoming increasingly tender   -Any changes in overall freddy such as: nausea/vomitting, fevers/chills, diarrhea/constipation, inability to urinate, chest pains, palpations, trouble breathing, coughing, etc         Laparoscopic Cholecystectomy   WHAT YOU NEED TO KNOW:   Laparoscopic cholecystectomy is surgery to remove your gallbladder  DISCHARGE INSTRUCTIONS:   Medicines: You may need any of the following:  · Prescription pain medicine  helps decrease pain  Do not wait until the pain is severe before you take this medicine  · NSAIDs  decrease swelling and pain  This medicine can be bought with or without a doctor's order  This medicine can cause stomach bleeding or kidney problems in certain people  If you take blood thinner medicine, always ask your healthcare provider if NSAIDs are safe for you  Read the medicine label and follow the directions on it before using this medicine  · Take your medicine as directed  Contact your healthcare provider if you think your medicine is not helping or if you have side effects   Tell him or her if you are allergic to any medicine  Keep a list of the medicines, vitamins, and herbs you take  Include the amounts, and when and why you take them  Bring the list or the pill bottles to follow-up visits  Carry your medicine list with you in case of an emergency  Follow up with your healthcare provider 2 weeks after surgery, or as directed:  Write down your questions so you remember to ask them during your visits  Wound care:  Care for your surgical wounds as directed  Keep the wounds clean and dry  You may take a shower the day after your surgery  What to eat after surgery:  Eat low-fat foods for 4 to 6 weeks while your body learns to digest fat without a gallbladder  Slowly increase the amount of fat that you eat  Drink plenty of liquids  Ask how much liquid to drink and which liquids are best for you  When to return to work and other activities: You may return to work or other activities as soon as your pain is controlled and you feel comfortable  For many people, this is 5 to 7 days after surgery  Contact your healthcare provider if:   · You have a fever over 101°F (38°C) or chills  · You have pain or nausea that is not relieved by medicine  · You have redness and swelling around your incisions, or blood or pus is leaking from your incisions  · You are constipated or have diarrhea  · Your skin or eyes are yellow, or your bowel movements are pale  · You have questions or concerns about your surgery, condition, or care  Seek care immediately or call 911 if:   · You cannot stop vomiting  · Your bowel movements are black or bloody  · You have pain in your abdomen and it is swollen or hard  · Your arm or leg feels warm, tender, and painful  It may look swollen and red  · You feel lightheaded, short of breath, and have chest pain  · You cough up blood      © Copyright [address] 2018 Information is for End User's use only and may not be sold, redistributed or otherwise used for commercial purposes  All illustrations and images included in CareNotes® are the copyrighted property of A D A M [employer] Inc  or Toan Lopes  The above information is an  only  It is not intended as medical advice for individual conditions or treatments  Talk to your doctor, nurse or pharmacist before following any medical regimen to see if it is safe and effective for you

## 2021-06-10 NOTE — DISCHARGE SUMMARY
Discharge Summary - Sukhi Martinez 31 y o  female MRN: [id number]  Unit/Bed#: -01 Encounter: [id number]    Admission Date: 2021   Discharge Date: 6/10/2021    Admitting Diagnosis:   Acute cholecystitis [K81 0]  Abdominal pain [R10 9]  Right upper quadrant pain [R10 11]    Principle/ Secondary Diagnosis:  History reviewed  No pertinent past medical history  Past Surgical History:   Procedure Laterality Date     SECTION      x 3    CHOLECYSTECTOMY LAPAROSCOPIC N/A 2021    Procedure: CHOLECYSTECTOMY LAPAROSCOPIC;  Surgeon: Patrick Rohtman MD;  Location: [facility];  Service: General    TUBAL LIGATION         Discharge Diagnoses:   Principal Problem:    Acute calculous cholecystitis      Procedures Performed:  No orders of the defined types were placed in this encounter  CHOLECYSTECTOMY LAPAROSCOPIC (N/A Abdomen)  Procedure(s):  CHOLECYSTECTOMY LAPAROSCOPIC    Consultants: none       HPI: As per Ignacia Garcia PA-C on 2021: "Sukhi Martinez is a 31 y o  female who presents with acute onset abdominal pain  The patient states the pain started suddenly this AM  She states the pain is primarily in RUQ and that it has worsened since onset which prompted her to go to the ED  CT of the abdomen and pelvis showed an abnormal appearance of the gallbladder with distention and pericholecystic edema suggesting acute cholecystitis At this time, the patient still notes significant abdominal pain  She also notes some nausea and emesis  Patient denies any prior history of similar pain, but does note that she has had intermittent mild post-prandial pain associated with large meals in the past that she never sought medical attention for  Prior medical history includes active IVDU  Patient reports usage of heroin and methamphetamines  She states she last used approximately 5 days ago  She denies any alcohol usage  She is a current 0 5 pack/day smoker  She denies any other medical history   Prior surgical history includes C-sections x3 and tubal ligation "    Summary of Hospital Course: Presented on 6/8/2021 with acute onset RUQ abdominal pain with associated N/V  Labs and imaging revealed acute cholecystitis  She was admitted to general surgery service, started on CLD NPO @ midnight, IVF, IV abx, pain control, DVT ppx, and was scheduled for lapraoscopic appendectomy  Operation was successful, with no complications  Op findings include:  Gallbladder was markedly distended with edema of the wall throughout and multiple adhesions to the surrounding fatty tissue  Liver surface appeared normal   Some adhesions on the right lower quadrant from prior C-sections  The rest of the abdominal cavity showed no evidence of inflammatory or neoplastic process  The patient was kept overnight for extended recovery for monitoring and pain control  On the morning of discharge the patient was tolerating regular diet, voiding without any difficulty, and pain was overall controlled  Patient was afebrile, vitals stable, and labs were WNL, only slight reactive leukocytosis s/s surgery  Plan Upon Discharge:  -Disposition: correctional facility  -FU in 2 weeks with Dr Odell Puga in outpatient surgical office  -Prescribed Tylenol3 prn mod-severe pain  -Diet as tolerated  -May shower  Remove dressings tomorrow  -Patient was educated to stay active and ambulate to prevent post-op complications    -Rest of discharge information provided on AVS       Physical Exam:   General Appearance: NAD, laying in bed comfortably  Head: Normocephalic, atraumatic  Neck: Supple, midline  Lungs: CTA bilaterally, normal lung effort  Cardiovascular: RRR, normal S1 and S2, no murmurs noted   Abdomen: soft, ND, NT, active bowel sounds   Skin: Warm, dry  No pallor, erythema, or cyanosis  Neuro: Alert and oriented  --------  Incision Sites: 4 laparoscopic port sites C/D/I with dressings covered   Epigastric port site dressing saturated with minimal amt serosanguinous fluid  Significant Findings, Care, Treatment and Services Provided: See Above  Us Gallbladder    Result Date: 6/8/2021  Impression: Abnormal study  Cholelithiasis with thickening of the gallbladder wall, pericholecystic edema suggestive of acute cholecystitis  There is a positive sonographic Robertson's sign  Common bile duct mildly distended at 7 mm  The study was marked in [facility] for immediate notification  Workstation performed: [id number]     Ct Abdomen Pelvis With Contrast    Result Date: 6/8/2021  Impression: Abnormal appearance of the gallbladder with distention and pericholecystic edema  The appearance may suggest cholecystitis  Consider follow-up ultrasound  The study was marked in [facility] for immediate notification  Workstation performed: [id number]       Complications: None    Discharge Diagnosis: S/p laparoscopic cholecystectomy     Resolved Problems  Date Reviewed: 6/9/2021    None        Principal Problem:    Acute calculous cholecystitis      Condition at Discharge: stable         Discharge instructions/Information to patient and family:   See after visit summary for information provided to patient and family  Provisions for Follow-Up Care:  See after visit summary for information related to follow-up care and any pertinent home health orders  PCP: Kathrine Soria DO    Disposition: Correctional facility    Planned Readmission: No    Discharge Statement   I spent 15 minutes discharging the patient  This time was spent on the day of discharge  I had direct contact with the patient on the day of discharge  Additional documentation is required if more than 30 minutes were spent on discharge  Discharge Medications:  See after visit summary for reconciled discharge medications provided to patient and family        [id number] [address] SARAH Tyler   6/10/2021   12:39 PM

## 2021-06-10 NOTE — PLAN OF CARE
Problem: Potential for Falls  Goal: Patient will remain free of falls  Description: INTERVENTIONS:  - Assess patient frequently for physical needs  -  Identify cognitive and physical deficits and behaviors that affect risk of falls    -  [place] fall precautions as indicated by assessment   - Educate patient/family on patient safety including physical limitations  - Instruct patient to call for assistance with activity based on assessment  - Modify environment to reduce risk of injury  - Consider OT/PT consult to assist with strengthening/mobility  Outcome: Progressing     Problem: PAIN - ADULT  Goal: Verbalizes/displays adequate comfort level or baseline comfort level  Description: Interventions:  - Encourage patient to monitor pain and request assistance  - Assess pain using appropriate pain scale  - Administer analgesics based on type and severity of pain and evaluate response  - Implement non-pharmacological measures as appropriate and evaluate response  - Consider cultural and social influences on pain and pain management  - Notify physician/advanced practitioner if interventions unsuccessful or patient reports new pain  Outcome: Progressing     Problem: INFECTION - ADULT  Goal: Absence or prevention of progression during hospitalization  Description: INTERVENTIONS:  - Assess and monitor for signs and symptoms of infection  - Monitor lab/diagnostic results  - Monitor all insertion sites, i e  indwelling lines, tubes, and drains  - Monitor endotracheal if appropriate and nasal secretions for changes in amount and color  - [place] appropriate cooling/warming therapies per order  - Administer medications as ordered  - Instruct and encourage patient and family to use good hand hygiene technique  - Identify and instruct in appropriate isolation precautions for identified infection/condition  Outcome: Progressing     Problem: SAFETY ADULT  Goal: Patient will remain free of falls  Description: INTERVENTIONS:  - Assess patient frequently for physical needs  -  Identify cognitive and physical deficits and behaviors that affect risk of falls  -  [place] fall precautions as indicated by assessment   - Educate patient/family on patient safety including physical limitations  - Instruct patient to call for assistance with activity based on assessment  - Modify environment to reduce risk of injury  - Consider OT/PT consult to assist with strengthening/mobility  Outcome: Progressing     Problem: DISCHARGE PLANNING  Goal: Discharge to home or other facility with appropriate resources  Description: INTERVENTIONS:  - Identify barriers to discharge w/patient and caregiver  - Arrange for needed discharge resources and transportation as appropriate  - Identify discharge learning needs (meds, wound care, etc )  - Arrange for interpretive services to assist at discharge as needed  - Refer to Case Management Department for coordinating discharge planning if the patient needs post-hospital services based on physician/advanced practitioner order or complex needs related to functional status, cognitive ability, or social support system  Outcome: Progressing     Problem: Knowledge Deficit  Goal: Patient/family/caregiver demonstrates understanding of disease process, treatment plan, medications, and discharge instructions  Description: Complete learning assessment and assess knowledge base    Interventions:  - Provide teaching at level of understanding  - Provide teaching via preferred learning methods  Outcome: Progressing     Problem: GASTROINTESTINAL - ADULT  Goal: Minimal or absence of nausea and/or vomiting  Description: INTERVENTIONS:  - Administer IV fluids if ordered to ensure adequate hydration  - Maintain NPO status until nausea and vomiting are resolved  - Nasogastric tube if ordered  - Administer ordered antiemetic medications as needed  - Provide nonpharmacologic comfort measures as appropriate  - Advance diet as tolerated, if ordered  - Consider nutrition services referral to assist patient with adequate nutrition and appropriate food choices  Outcome: Progressing     Problem: Nutrition/Hydration-ADULT  Goal: Nutrient/Hydration intake appropriate for improving, restoring or maintaining nutritional needs  Description: Monitor and assess patient's nutrition/hydration status for malnutrition  Collaborate with interdisciplinary team and initiate plan and interventions as ordered  Monitor patient's weight and dietary intake as ordered or per policy  Utilize nutrition screening tool and intervene as necessary  Determine patient's food preferences and provide high-protein, high-caloric foods as appropriate       INTERVENTIONS:  - Monitor oral intake, urinary output, labs, and treatment plans  - Assess nutrition and hydration status and recommend course of action  - Evaluate amount of meals eaten  - Assist patient with eating if necessary   - Allow adequate time for meals  - Recommend/ encourage appropriate diets, oral nutritional supplements, and vitamin/mineral supplements  - Order, calculate, and assess calorie counts as needed  - Recommend, monitor, and adjust tube feedings and TPN/PPN based on assessed needs  - Assess need for intravenous fluids  - Provide specific nutrition/hydration education as appropriate  - Include patient/family/caregiver in decisions related to nutrition  Outcome: Progressing

## 2021-06-24 ENCOUNTER — OFFICE VISIT (OUTPATIENT)
Dept: SURGERY | Facility: CLINIC | Age: 31
End: 2021-06-24

## 2021-06-24 VITALS
TEMPERATURE: 97.6 F | RESPIRATION RATE: 14 BRPM | DIASTOLIC BLOOD PRESSURE: 62 MMHG | SYSTOLIC BLOOD PRESSURE: 100 MMHG | HEIGHT: 64 IN | WEIGHT: 157 LBS | BODY MASS INDEX: 26.8 KG/M2 | HEART RATE: 89 BPM

## 2021-06-24 DIAGNOSIS — Z90.49 S/P LAPAROSCOPIC CHOLECYSTECTOMY: Primary | ICD-10-CM

## 2021-06-24 PROCEDURE — 99024 POSTOP FOLLOW-UP VISIT: CPT | Performed by: SURGERY

## 2021-06-24 NOTE — PROGRESS NOTES
Assessment/Plan:    Pathology Results: There are no diagnoses linked to this encounter  Subjective: Lucio Storm     Patient ID: Terry Latham is a 31 y o  female  Triage Notes:    HPI    The following portions of the patient's history were reviewed and updated as appropriate: allergies, current medications, past family history, past medical history, past social history, past surgical history and problem list     Review of Systems      Objective:      /62   Pulse 89   Temp 97 6 °F (36 4 °C) (Temporal)   Resp 14   Ht 5' 4" (1 626 m)   Wt 71 2 kg (157 lb)   Breastfeeding No   BMI 26 95 kg/m²     Below is the patient's most recent value for Albumin, ALT, AST, BUN, Calcium, Chloride, Cholesterol, CO2, Creatinine, GFR, Glucose, HDL, Hematocrit, Hemoglobin, Hemoglobin A1C, LDL, Magnesium, Phosphorus, Platelets, Potassium, PSA, Sodium, Triglycerides, and WBC  Lab Results   Component Value Date    ALT 30 06/10/2021    AST 17 06/10/2021    BUN 10 06/10/2021    CALCIUM 8 6 06/10/2021     06/10/2021    CO2 27 06/10/2021    CREATININE 0 80 06/10/2021    HCT 40 0 06/10/2021    HGB 13 1 06/10/2021     06/10/2021    K 4 2 06/10/2021    WBC 13 00 (H) 06/10/2021     Note: for a comprehensive list of the patient's lab results, access the Results Review activity       Physical Exam        Procedures

## 2021-06-25 NOTE — PROGRESS NOTES
Assessment/Plan:    Pathology Results:       1  S/P laparoscopic cholecystectomy        Final Diagnosis   A  Gallbladder, cholecystectomy:    -Cholelithiasis  -Moderate acute & chronic cholecystitis      Recovering well after laparoscopic cholecystectomy  No signs or symptoms of infection  Pathology was reviewed and there were no unusual or unexpected findings and no malignancy  Resume regular activity  Followup if needed  Subjective:      Patient ID: Cameron Saleh is a 31 y o  female  Triage Notes:    Nat Carbajal is a 32 yo F following up after lap cholecystectomy by my partner Dr Eamon Singleton  She reports doing quite well and has no complaints  She is eating and has no postprandial pain  No diarrhea or constipation  No fevers/chills/redness/drainage  Energy is normal        The following portions of the patient's history were reviewed and updated as appropriate: allergies, current medications, past family history, past medical history, past social history, past surgical history and problem list     Review of Systems      Objective:      /62   Pulse 89   Temp 97 6 °F (36 4 °C) (Temporal)   Resp 14   Ht 5' 4" (1 626 m)   Wt 71 2 kg (157 lb)   Breastfeeding No   BMI 26 95 kg/m²     Below is the patient's most recent value for Albumin, ALT, AST, BUN, Calcium, Chloride, Cholesterol, CO2, Creatinine, GFR, Glucose, HDL, Hematocrit, Hemoglobin, Hemoglobin A1C, LDL, Magnesium, Phosphorus, Platelets, Potassium, PSA, Sodium, Triglycerides, and WBC  Lab Results   Component Value Date    ALT 30 06/10/2021    AST 17 06/10/2021    BUN 10 06/10/2021    CALCIUM 8 6 06/10/2021     06/10/2021    CO2 27 06/10/2021    CREATININE 0 80 06/10/2021    HCT 40 0 06/10/2021    HGB 13 1 06/10/2021     06/10/2021    K 4 2 06/10/2021    WBC 13 00 (H) 06/10/2021     Note: for a comprehensive list of the patient's lab results, access the Results Review activity  Physical Exam  Vitals and nursing note reviewed  Constitutional:       General: She is not in acute distress  Appearance: She is well-developed  She is not diaphoretic  HENT:      Head: Normocephalic and atraumatic  Eyes:      Pupils: Pupils are equal, round, and reactive to light  Cardiovascular:      Rate and Rhythm: Normal rate and regular rhythm  Pulmonary:      Effort: Pulmonary effort is normal    Abdominal:      General: There is no distension  Palpations: Abdomen is soft  There is no mass  Tenderness: There is no guarding or rebound  Hernia: No hernia is present  Comments: The laparoscopic port sites are clean and dry with no erythema or drainage  Musculoskeletal:         General: Normal range of motion  Cervical back: Normal range of motion and neck supple  Skin:     General: Skin is warm and dry  Neurological:      Mental Status: She is alert and oriented to person, place, and time  Psychiatric:         Mood and Affect: Mood normal          Behavior: Behavior normal          Thought Content:  Thought content normal          Judgment: Judgment normal              Procedures

## (undated) DEVICE — DRAPE C-ARM X-RAY

## (undated) DEVICE — GLOVE INDICATOR PI UNDERGLOVE SZ 7.5 BLUE

## (undated) DEVICE — ELECTRODE LAP L WIRE E-Z CLEAN 33CM -0100

## (undated) DEVICE — PAD GROUNDING ADULT

## (undated) DEVICE — GAUZE SPONGES,8 PLY: Brand: CURITY

## (undated) DEVICE — PENCIL ELECTROSURG E-Z CLEAN -0035H

## (undated) DEVICE — TROCAR: Brand: KII FIOS FIRST ENTRY

## (undated) DEVICE — ENDOPATH 5MM CURVED SCISSORS WITH MONOPOLAR CAUTERY: Brand: ENDOPATH

## (undated) DEVICE — SCD SEQUENTIAL COMPRESSION COMFORT SLEEVE MEDIUM KNEE LENGTH: Brand: KENDALL SCD

## (undated) DEVICE — 3M™ STERI-STRIP™ REINFORCED ADHESIVE SKIN CLOSURES, R1546, 1/4 IN X 4 IN (6 MM X 100 MM), 10 STRIPS/ENVELOPE: Brand: 3M™ STERI-STRIP™

## (undated) DEVICE — CHLORAPREP HI-LITE 26ML ORANGE

## (undated) DEVICE — INTENDED FOR TISSUE SEPARATION, AND OTHER PROCEDURES THAT REQUIRE A SHARP SURGICAL BLADE TO PUNCTURE OR CUT.: Brand: BARD-PARKER SAFETY BLADES SIZE 15, STERILE

## (undated) DEVICE — LIGHT HANDLE COVER SLEEVE DISP BLUE STELLAR

## (undated) DEVICE — [HIGH FLOW INSUFFLATOR,  DO NOT USE IF PACKAGE IS DAMAGED,  KEEP DRY,  KEEP AWAY FROM SUNLIGHT,  PROTECT FROM HEAT AND RADIOACTIVE SOURCES.]: Brand: PNEUMOSURE

## (undated) DEVICE — SUT VICRYL 4-0 PS-2 27 IN J426H

## (undated) DEVICE — LIGAMAX 5 MM ENDOSCOPIC MULTIPLE CLIP APPLIER: Brand: LIGAMAX

## (undated) DEVICE — CHOLE CATH KIT ARROW

## (undated) DEVICE — GLOVE SRG BIOGEL ECLIPSE 7.5

## (undated) DEVICE — IRRIG ENDO FLO TUBING

## (undated) DEVICE — SYRINGE 10ML LL

## (undated) DEVICE — ALLENTOWN LAP CHOLE APP PACK: Brand: CARDINAL HEALTH

## (undated) DEVICE — IV CATH 14 G X 1.75

## (undated) DEVICE — TUBING SMOKE EVAC W/FILTRATION DEVICE PLUMEPORT ACTIV

## (undated) DEVICE — 5 MM CURVED DISSECTORS WITH MONOPOLAR CAUTERY: Brand: ENDOPATH

## (undated) DEVICE — TROCAR: Brand: KII® SLEEVE

## (undated) DEVICE — ENDOPOUCH RETRIEVER SPECIMEN RETRIEVAL BAGS: Brand: ENDOPOUCH RETRIEVER

## (undated) DEVICE — 3M™ TEGADERM™ TRANSPARENT FILM DRESSING FRAME STYLE, 1624W, 2-3/8 IN X 2-3/4 IN (6 CM X 7 CM), 100/CT 4CT/CASE: Brand: 3M™ TEGADERM™

## (undated) DEVICE — COTTON TIP APPLICTOR 2 PK

## (undated) DEVICE — DRAPE EQUIPMENT RF WAND